# Patient Record
Sex: FEMALE | Race: WHITE | NOT HISPANIC OR LATINO | Employment: FULL TIME | URBAN - METROPOLITAN AREA
[De-identification: names, ages, dates, MRNs, and addresses within clinical notes are randomized per-mention and may not be internally consistent; named-entity substitution may affect disease eponyms.]

---

## 2017-09-29 ENCOUNTER — OFFICE VISIT (OUTPATIENT)
Dept: FAMILY MEDICINE | Facility: CLINIC | Age: 47
End: 2017-09-29
Payer: COMMERCIAL

## 2017-09-29 VITALS
SYSTOLIC BLOOD PRESSURE: 107 MMHG | HEART RATE: 87 BPM | TEMPERATURE: 98.2 F | RESPIRATION RATE: 18 BRPM | WEIGHT: 119 LBS | BODY MASS INDEX: 22.47 KG/M2 | HEIGHT: 61 IN | OXYGEN SATURATION: 98 % | DIASTOLIC BLOOD PRESSURE: 70 MMHG

## 2017-09-29 DIAGNOSIS — L98.9 SKIN LESION: ICD-10-CM

## 2017-09-29 DIAGNOSIS — Z00.00 ROUTINE GENERAL MEDICAL EXAMINATION AT A HEALTH CARE FACILITY: Primary | ICD-10-CM

## 2017-09-29 DIAGNOSIS — Z23 NEED FOR TDAP VACCINATION: ICD-10-CM

## 2017-09-29 LAB
CHOLEST SERPL-MCNC: 164 MG/DL
HDLC SERPL-MCNC: 67 MG/DL
LDLC SERPL CALC-MCNC: 81 MG/DL
NONHDLC SERPL-MCNC: 97 MG/DL
TRIGL SERPL-MCNC: 82 MG/DL

## 2017-09-29 PROCEDURE — 90471 IMMUNIZATION ADMIN: CPT | Performed by: FAMILY MEDICINE

## 2017-09-29 PROCEDURE — 87624 HPV HI-RISK TYP POOLED RSLT: CPT | Performed by: FAMILY MEDICINE

## 2017-09-29 PROCEDURE — 90715 TDAP VACCINE 7 YRS/> IM: CPT | Performed by: FAMILY MEDICINE

## 2017-09-29 PROCEDURE — 36415 COLL VENOUS BLD VENIPUNCTURE: CPT | Performed by: FAMILY MEDICINE

## 2017-09-29 PROCEDURE — G0145 SCR C/V CYTO,THINLAYER,RESCR: HCPCS | Performed by: FAMILY MEDICINE

## 2017-09-29 PROCEDURE — 80061 LIPID PANEL: CPT | Performed by: FAMILY MEDICINE

## 2017-09-29 PROCEDURE — 99396 PREV VISIT EST AGE 40-64: CPT | Mod: 25 | Performed by: FAMILY MEDICINE

## 2017-09-29 NOTE — LETTER
October 11, 2017    Sidra Macedo  [address] [facility] [address]    Dear Sidra,  We are happy to inform you that your PAP smear result from 09/29/17 is normal.  We are now able to do a follow up test on PAP smears. The DNA test is for HPV (Human Papilloma Virus). Cervical cancer is closely linked with certain types of HPV. Your result showed no evidence of high risk HPV.  Therefore we recommend you return in 5 years for your next pap smear and HPV test.  You will still need to return to the clinic every year for an annual exam and other preventive tests.  Please contact the clinic at [phone] with any questions.  Sincerely,    Quita Zee MD/uriel

## 2017-09-29 NOTE — MR AVS SNAPSHOT
After Visit Summary   9/29/2017    Sidra Macedo    MRN: [id number]           Patient Information     Date Of Birth          1970        Visit Information        Provider Department      9/29/2017 1:40 PM Quita Zee MD [facility]        Today's Diagnoses     Routine general medical examination at a health care facility    -  1    Need for Tdap vaccination        Skin lesion          Care Instructions      Preventive Health Recommendations  Female Ages 40 to 49    Yearly exam:     See your health care provider every year in order to  1. Review health changes.   2. Discuss preventive care.    3. Review your medicines if your doctor prescribed any.      Get a Pap test every three years (unless you have an abnormal result and your provider advises testing more often).      If you get Pap tests with HPV test, you only need to test every 5 years, unless you have an abnormal result. You do not need a Pap test if your uterus was removed (hysterectomy) and you have not had cancer.      You should be tested each year for STDs (sexually transmitted diseases), if you're at risk.       Ask your doctor if you should have a mammogram.      Have a colonoscopy (test for colon cancer) if someone in your family has had colon cancer or polyps before age 50.       Have a cholesterol test every 5 years.       Have a diabetes test (fasting glucose) after age 45. If you are at risk for diabetes, you should have this test every 3 years.    Shots: Get a flu shot each year. Get a tetanus shot every 10 years.     Nutrition:     Eat at least 5 servings of fruits and vegetables each day.    Eat whole-grain bread, whole-wheat pasta and brown rice instead of white grains and rice.    Talk to your provider about Calcium and Vitamin D.     Lifestyle    Exercise at least 150 minutes a week (an average of 30 minutes a day, 5 days a week). This will help you control your weight and prevent  "disease.    Limit alcohol to one drink per day.    No smoking.     Wear sunscreen to prevent skin cancer.    See your dentist every six months for an exam and cleaning.          Follow-ups after your visit        Additional Services     DERMATOLOGY REFERRAL       Your provider has referred you to: [facility]: [facility] [place] [phone]   [email or web address]    Please be aware that coverage of these services is subject to the terms and limitations of your health insurance plan.  Call member services at your health plan with any benefit or coverage questions.      Please bring the following with you to your appointment:    (1) Any X-Rays, CTs or MRIs which have been performed.  Contact the facility where they were done to arrange for  prior to your scheduled appointment.    (2) List of current medications  (3) This referral request   (4) Any documents/labs given to you for this referral                  Who to contact     If you have questions or need follow up information about today's clinic visit or your schedule please contact [facility] directly at [phone].  Normal or non-critical lab and imaging results will be communicated to you by MyChart, letter or phone within 4 business days after the clinic has received the results. If you do not hear from us within 7 days, please contact the clinic through [employer] or phone. If you have a critical or abnormal lab result, we will notify you by phone as soon as possible.  Submit refill requests through [employer] or call your pharmacy and they will forward the refill request to us. Please allow 3 business days for your refill to be completed.          Additional Information About Your Visit        [employer] Information     [employer] lets you send messages to your doctor, view your test results, renew your prescriptions, schedule appointments and more. To sign up, go to [email or web address] . Click on \"Log in\" " "on the left side of the screen, which will take you to the Welcome page. Then click on \"Sign up Now\" on the right side of the page.     You will be asked to enter the access code listed below, as well as some personal information. Please follow the directions to create your username and password.     Your access code is: [email or web address]  Expires: 2017  2:40 PM     Your access code will  in 90 days. If you need help or a new code, please call your [facility] clinic or [phone].        Care EveryWhere ID     This is your Care EveryWhere ID. This could be used by other organizations to access your [facility] medical records  [id number]        Your Vitals Were     Pulse Temperature Respirations Height Last Period Pulse Oximetry    87 98.2  F (36.8  C) (Tympanic) 18 5' 0.75\" (1.543 m) 2017 (Exact Date) 98%    BMI (Body Mass Index)                   22.67 kg/m2            Blood Pressure from Last 3 Encounters:   17 107/70   09/17/15 110/66   06 118/64    Weight from Last 3 Encounters:   17 119 lb (54 kg)   09/17/15 119 lb (54 kg)   06 132 lb (59.9 kg)              We Performed the Following     DERMATOLOGY REFERRAL     HPV High Risk Types DNA Cervical     Lipid Profile     Pap imaged thin layer screen with HPV - recommended age 30 - 65     TDAP VACCINE (ADACEL)        Primary Care Provider    None Specified       No primary provider on file.        Equal Access to Services     [facility] Hospital[facility] : Hadii rosy Young, waaxda luosmaniadaha, qaybta kaalmada cesar, fay kumar . So [facility] [phone].    ATENCIÓN: Si habla español, tiene a aguilar disposición servicios gratuitos de asistencia lingüística. Llame al [phone].    We comply with applicable federal civil rights laws and [place] laws. We do not discriminate on the basis of race, color, national origin, age, disability, sex, sexual orientation, or gender identity.            Thank you!     " Thank you for choosing [facility]  for your care. Our goal is always to provide you with excellent care. Hearing back from our patients is one way we can continue to improve our services. Please take a few minutes to complete the written survey that you may receive in the mail after your visit with us. Thank you!             Your Updated Medication List - Protect others around you: Learn how to safely use, store and throw away your medicines at www.disposemymeds.org.          This list is accurate as of: 9/29/17  2:40 PM.  Always use your most recent med list.                   Brand Name Dispense Instructions for use Diagnosis    clindamycin 300 MG capsule    CLEOCIN    14 capsule    Take 1 cap po BID x 7 days    BV (bacterial vaginosis)       VITAMIN B 12 PO           VITAMIN D (CHOLECALCIFEROL) PO      Take by mouth daily        VITAMIN E PO

## 2017-09-29 NOTE — NURSING NOTE
Screening Questionnaire for Adult Immunization     Are you sick today?   No    Do you have allergies to medications, food or any vaccine?   No    Have you ever had a serious reaction after receiving a vaccination?   No    Do you have a long-term health problem with heart disease, lung disease,  asthma, kidney disease, diabetes, anemia, metabolic or blood disease?   No    Do you have cancer, leukemia, AIDS, or any immune system problem?   No    Do you take cortisone, prednisone, other steroids, or anticancer drugs, or  have you had any x-ray (radiation) treatments?   No    Have you had a seizure, brain, or other nervous system problem?   No    During the past year, have you received a transfusion of blood or blood       products, or been given a medicine called immune (gamma) globulin?   No    For women: Are you pregnant or is there a chance you could become         pregnant during the next month?   No    Have you received any vaccinations in the past 4 weeks?   No     Immunization questionnaire answers were all negative.      MNVFC doesn't apply on this patient     Screening performed by Marleny Downey on 9/29/2017 at 3:08 PM.  Per orders of Dr. Zee, injection(s) of TDAP given by Marleny Downey. Patient instructed to remain in clinic for 20 minutes afterwards, and to report any adverse reaction to me immediately

## 2017-09-29 NOTE — PROGRESS NOTES
SUBJECTIVE:   CC: Sidra Macedo is an 47 year old woman who presents for preventive health visit.     Physical   Annual:     Getting at least 3 servings of Calcium per day::  Yes    Bi-annual eye exam::  NO    Dental care twice a year::  NO    Sleep apnea or symptoms of sleep apnea::  None    Diet::  Regular (no restrictions)    Taking medications regularly::  Yes    Medication side effects::  Not applicable    Additional concerns today::  YES (cyst on right side abdomen, and headaches )      Headaches: she reports a long history of tension headaches, but over hte past 6-8 months, she has been getting some different headaches which are very brief, just a flash of pain on the top/middle of her head.  She has not identified any triggers; she does not know if it happens more at rest or with exertion.  It does not wake her from sleep.  It is not associated with other symptoms.  It might happen 3-4 times per day for a week, then it might not happen again for a while.  She wonders if it might be stress.     Skin lesions: a newer dark skin lesion to her forehead, not growing or change, not symptomatic.  Her father has had melanoma.  She did use tanning beds a bit in her youth.  She has also had a bump to her genital area which she thought was just an ingrown hair; she pulled on the hair and some pus came out; it has not drained since, but there is a lump there.  It is not painful.  No fevers.      CV: early CAD in the family in father with MI at 50, also PGF.  The patient smokes 1 ppd.    Malignancy: She had a molar pregnancy/cancer growth that was addressed in the past with OB/GYN.  She has not had a physical since 2006, which was her last pap.  She has not had a mammogram. Her mother has had breast cancer which started at age 63; her mother had negative testing for BRCA.  Her paternal grandmother also had breast cancer, ovarian cancer and liver cancer.  Her maternal grandmother had lung cancer.  The patient herself  smokes 1ppd, which she has done for 30 years.  She is not ready to quit. No colon cancer in the family   Father has had melanoma.    Bone health: tobacco use  Immunizations: due for tdap  Sexual health: periods are regular, no concern for STI, no vaginal discharge/itching/odor.  She has had two vaginal deliveries (children are 23 and 13).  Patient's last menstrual period was 08/28/2017 (exact date).  Depression screen:    Right knee surgery-acl    She has quit smoking in the past with the help of Chantix (per note review this caused constipation).  She states she is not ready yet to try and quit smoking.          Today's PHQ-2 Score:   PHQ-2 ( 1999 Pfizer) 9/29/2017   Q1: Little interest or pleasure in doing things 0   Q2: Feeling down, depressed or hopeless 0   PHQ-2 Score 0   Q1: Little interest or pleasure in doing things Not at all   Q2: Feeling down, depressed or hopeless Not at all   PHQ-2 Score 0       Abuse: Current or Past(Physical, Sexual or Emotional)- No  Do you feel safe in your environment - Yes    Social History   Substance Use Topics     Smoking status: Current Every Day Smoker     Last attempt to quit: 8/1/2005     Smokeless tobacco: Never Used     Alcohol use No     The patient does not drink >3 drinks per day nor >7 drinks per week.    Reviewed orders with patient.  Reviewed health maintenance and updated orders accordingly - Yes  Patient Active Problem List   Diagnosis     Pregnancy with history of trophoblastic disease     Past Surgical History:   Procedure Laterality Date     C NONSPECIFIC PROCEDURE      ACL repair right knee       Social History   Substance Use Topics     Smoking status: Current Every Day Smoker     Packs/day: 1.00     Years: 30.00     Last attempt to quit: 8/1/2005     Smokeless tobacco: Never Used     Alcohol use No     Family History   Problem Relation Age of Onset     Respiratory Mother      asthma     Breast Cancer Mother 63     BRCA negative     HEART DISEASE Father 50      bypass     Alcohol/Drug Father      Coronary Artery Disease Early Onset Paternal Grandfather      Breast Cancer Paternal Grandmother      Ovarian Cancer Paternal Grandmother      Liver Cancer Paternal Grandmother      HEART DISEASE Maternal Grandmother      CHF     Respiratory Maternal Grandmother      as     CANCER Maternal Grandmother      lung     Alcohol/Drug Maternal Grandmother      Neurologic Disorder Brother      seizures     Asthma Brother          Current Outpatient Prescriptions   Medication Sig Dispense Refill     Cyanocobalamin (VITAMIN B 12 PO)        VITAMIN D, CHOLECALCIFEROL, PO Take by mouth daily       VITAMIN E PO        clindamycin (CLEOCIN) 300 MG capsule Take 1 cap po BID x 7 days (Patient not taking: Reported on 9/29/2017) 14 capsule 0     No Known Allergies      Patient under age 50, mutual decision reflected in health maintenance.        Pertinent mammograms are reviewed under the imaging tab.  History of abnormal Pap smear: NO - age 30-65 PAP every 5 years with negative HPV co-testing recommended    Reviewed and updated as needed this visit by clinical staffTobacco  Allergies  Meds  Med Hx  Surg Hx  Fam Hx  Soc Hx        Reviewed and updated as needed this visit by Provider            ROS:  C: NEGATIVE for fever, chills, change in weight  I: NEGATIVE for worrisome rashes, moles or lesions  E: NEGATIVE for vision changes or irritation  ENT: NEGATIVE for ear, mouth and throat problems  R: NEGATIVE for significant cough or SOB  B: NEGATIVE for masses, tenderness or discharge  CV: NEGATIVE for chest pain, palpitations or peripheral edema  GI: NEGATIVE for nausea, abdominal pain, heartburn, or change in bowel habits  : NEGATIVE for unusual urinary or vaginal symptoms. Periods are regular.  M: NEGATIVE for significant arthralgias or myalgia  N: NEGATIVE for weakness, dizziness or paresthesias  P: NEGATIVE for changes in mood or affect     OBJECTIVE:   /70 (BP Location: Right  "arm, Patient Position: Sitting, Cuff Size: Adult Regular)  Pulse 87  Temp 98.2  F (36.8  C) (Tympanic)  Resp 18  Ht 5' 0.75\" (1.543 m)  Wt 119 lb (54 kg)  LMP 08/28/2017 (Exact Date)  SpO2 98%  BMI 22.67 kg/m2  EXAM:  GENERAL: healthy, alert and no distress  EYES: Eyes grossly normal to inspection, PERRL and conjunctivae and sclerae normal  HENT: ear canals and TM's normal, nose and mouth without ulcers or lesions  NECK: no adenopathy, no asymmetry, masses, or scars and thyroid normal to palpation  RESP: lungs clear to auscultation - no rales, rhonchi or wheezes  BREAST: normal without masses, tenderness or nipple discharge and no palpable axillary masses or adenopathy  CV: regular rate and rhythm, normal S1 S2, no S3 or S4, no murmur, click or rub, no peripheral edema and peripheral pulses strong  ABDOMEN: soft, nontender, no hepatosplenomegaly, no masses and bowel sounds normal   (female): normal female external genitalia, normal urethral meatus, vaginal mucosa pink, moist, well rugated, and normal cervix/adnexa/uterus without masses or discharge (difficult exam, uterus tilted, cervix very posterior and to the patient's left)  MS: no gross musculoskeletal defects noted, no edema  SKIN: a 3-4mm dark brown/black nevus to her right forehead with sharp borders just below the scalp line, there is a larger >5mm dark brown/black nevus with slightly blurred borders to her mid back, and a smaller similar dark lesion to her lower back.  There is a 1.5cm nodule to her mons pubis that is firm, mobile, and nontender; there isn't any fluctuance, erythema or surrounding induration.  There are mildly enlarged and tender inguinal LAD bilaterally.  Otherwise no suspicious lesions or rashes  NEURO: Normal strength and tone, mentation intact and speech normal  PSYCH: mentation appears normal, affect normal/bright    ASSESSMENT/PLAN:   1. Routine general medical examination at a health care facility  CV: lipid panel today " "(not fasting), strongly advised smoking cessation.   Malignancy: pap and HPV today, recommended she start mammograms, given her family history, and recommended colonoscopy at 50.  Recommended dermatology evaluation of her skin, with some very dark brown/black lesions.  Again, smoking cessation advised.  She will qualify for lung cancer screening at age 54.  Bone health:: urged tobacco discontinuation  Immunizations: tdap    - Pap imaged thin layer screen with HPV - recommended age 30 - 65  - HPV High Risk Types DNA Cervical  - Lipid Profile    2. Need for Tdap vaccination    - TDAP VACCINE (ADACEL)  - ADMIN 1st VACCINE    3. Skin lesion  The lesion to the mons is a healing abscess or cyst; there was no fluctuance, erythema or tenderness to indicate active infection or need for I&D at this time.  Could discuss with dermatology along with total skin check.    - DERMATOLOGY REFERRAL    4.  Headaches: normal neuro exam today, f/u if needed, no red flags to suggest urgent imaging.       COUNSELING:  Reviewed preventive health counseling, as reflected in patient instructions       Regular exercise       Healthy diet/nutrition       Immunizations    Vaccinated for: TDAP               reports that she has been smoking.  She has never used smokeless tobacco.  Tobacco Cessation Action Plan: Information offered: Patient not interested at this time  Estimated body mass index is 22.67 kg/(m^2) as calculated from the following:    Height as of this encounter: 5' 0.75\" (1.543 m).    Weight as of this encounter: 119 lb (54 kg).       Counseling Resources:  ATP IV Guidelines  Pooled Cohorts Equation Calculator  Breast Cancer Risk Calculator  FRAX Risk Assessment  ICSI Preventive Guidelines  Dietary Guidelines for Americans, 2010  [employer]'s MyPlate  ASA Prophylaxis  Lung CA Screening    Quita Zee MD  [facility]  Answers for HPI/ROS submitted by the patient on 9/29/2017   PHQ-2 Score: 0    "

## 2017-09-29 NOTE — LETTER
October 9, 2017      [facility]  [address] [address]        Dear ,    We are writing to inform you of your test results.    Your cholesterol results are excellent.  Let us know if you have any questions.     Resulted Orders   Lipid Profile   Result Value Ref Range    Cholesterol 164 <200 mg/dL    Triglycerides 82 <150 mg/dL      Comment:      Non Fasting    HDL Cholesterol 67 >49 mg/dL    LDL Cholesterol Calculated 81 <100 mg/dL      Comment:      Desirable:       <100 mg/dl    Non HDL Cholesterol 97 <130 mg/dL       If you have any questions or concerns, please call the clinic at the number listed above.       Sincerely,        Quita Zee MD/nr

## 2017-09-29 NOTE — NURSING NOTE
"Chief Complaint   Patient presents with     Physical       Initial /70 (BP Location: Right arm, Patient Position: Sitting, Cuff Size: Adult Regular)  Pulse 87  Temp 98.2  F (36.8  C) (Tympanic)  Resp 18  Ht 5' 0.75\" (1.543 m)  Wt 119 lb (54 kg)  LMP 08/28/2017 (Exact Date)  SpO2 98%  BMI 22.67 kg/m2 Estimated body mass index is 22.67 kg/(m^2) as calculated from the following:    Height as of this encounter: 5' 0.75\" (1.543 m).    Weight as of this encounter: 119 lb (54 kg).  Medication Reconciliation: complete       Marleny Downey MA      "

## 2017-10-03 LAB
COPATH REPORT: NORMAL
PAP: NORMAL

## 2017-10-09 LAB
FINAL DIAGNOSIS: NORMAL
HPV HR 12 DNA CVX QL NAA+PROBE: NEGATIVE
HPV16 DNA SPEC QL NAA+PROBE: NEGATIVE
HPV18 DNA SPEC QL NAA+PROBE: NEGATIVE
SPECIMEN DESCRIPTION: NORMAL

## 2018-02-06 ENCOUNTER — OFFICE VISIT (OUTPATIENT)
Dept: PEDIATRICS | Facility: CLINIC | Age: 48
End: 2018-02-06
Payer: COMMERCIAL

## 2018-02-06 VITALS
SYSTOLIC BLOOD PRESSURE: 130 MMHG | WEIGHT: 126 LBS | BODY MASS INDEX: 24 KG/M2 | RESPIRATION RATE: 16 BRPM | TEMPERATURE: 98.3 F | DIASTOLIC BLOOD PRESSURE: 70 MMHG | HEART RATE: 82 BPM

## 2018-02-06 DIAGNOSIS — R59.1 LA (LYMPHADENOPATHY): Primary | ICD-10-CM

## 2018-02-06 PROCEDURE — 99213 OFFICE O/P EST LOW 20 MIN: CPT | Performed by: INTERNAL MEDICINE

## 2018-02-06 NOTE — PROGRESS NOTES
"  SUBJECTIVE:   Sidra Macedo is a 48 year old female who presents to clinic today for the following health issues:      Sidra presents to the clinic for the complaint of a lump/bump on the lower left back of her head. Patient noticed the area on Friday, but became more painful yesterday. She notes associated tenderness in the left side of her neck that started yesterday. Patient notes she was out of the country in [place] for 10 days, she just returned yesterday.  She has noted soaking sweats at night  for about 1 week.  Notes a minor rash on her chest she believes is a heat rash. Endorses constipation. She does note that sweats related to  Perimenopausal \"come and go\" but she has not had any recently. Denies nausea, vomiting, weight loss, sore throat, fever and cough.       Problem list and histories reviewed & adjusted, as indicated.  Additional history: as documented    Patient Active Problem List   Diagnosis     Pregnancy with history of trophoblastic disease     Past Surgical History:   Procedure Laterality Date     C NONSPECIFIC PROCEDURE      ACL repair right knee       Social History   Substance Use Topics     Smoking status: Current Every Day Smoker     Packs/day: 1.00     Years: 30.00     Last attempt to quit: 8/1/2005     Smokeless tobacco: Never Used     Alcohol use No     Family History   Problem Relation Age of Onset     Respiratory Mother      asthma     Breast Cancer Mother 63     BRCA negative     HEART DISEASE Father 50     bypass     Alcohol/Drug Father      Coronary Artery Disease Early Onset Paternal Grandfather      Breast Cancer Paternal Grandmother      Ovarian Cancer Paternal Grandmother      Liver Cancer Paternal Grandmother      HEART DISEASE Maternal Grandmother      CHF     Respiratory Maternal Grandmother      as     CANCER Maternal Grandmother      lung     Alcohol/Drug Maternal Grandmother      Neurologic Disorder Brother      seizures     Asthma Brother          Current Outpatient " Prescriptions   Medication Sig Dispense Refill     Cyanocobalamin (VITAMIN B 12 PO)        VITAMIN D, CHOLECALCIFEROL, PO Take by mouth daily       VITAMIN E PO        No Known Allergies  BP Readings from Last 3 Encounters:   02/06/18 130/70   09/29/17 107/70   09/17/15 110/66    Wt Readings from Last 3 Encounters:   02/06/18 57.2 kg (126 lb)   09/29/17 54 kg (119 lb)   09/17/15 54 kg (119 lb)                  Labs reviewed in EPIC    Reviewed and updated as needed this visit by clinical staff  Tobacco  Allergies  Meds  Med Hx  Surg Hx  Fam Hx  Soc Hx      Reviewed and updated as needed this visit by Provider         ROS:  Constitutional, HEENT, cardiovascular, pulmonary, gi and gu systems are negative, except as otherwise noted.      This document serves as a record of the services and decisions personally performed and made by Mirna Dillard MD. It was created on her behalf by Salma Campbell, a trained medical scribe. The creation of this document is based the provider's statements to the medical scribe.    Salma Campbell February 6, 2018 11:38 AM  OBJECTIVE:     /70 (BP Location: Right arm, Patient Position: Chair, Cuff Size: Adult Regular)  Pulse 82  Temp 98.3  F (36.8  C) (Oral)  Resp 16  Wt 57.2 kg (126 lb)  BMI 24 kg/m2  Body mass index is 24 kg/(m^2).  GENERAL: healthy, alert and no distress  Head:  2 cm tender lump left occipital area.  Not erythematous, no drainage noted  HENT: ear canals and TM's normal, nose and mouth without ulcers or lesions. No conjunctival irritation noted.   NECK: , no asymmetry, masses, or scars and thyroid normal to palpation.  Left anterior cervical chain very tender to palpation, difficult to assess discrete LN.   RESP: lungs clear to auscultation - no rales, rhonchi or wheezes  CV: regular rate and rhythm, normal S1 S2, no S3 or S4, no murmur, click or rub   ABDOMEN: soft, nontender, no hepatosplenomegaly, no masses and bowel sounds normal  MS: no gross  musculoskeletal defects noted, no edema  PSYCH: mentation appears normal, affect normal/bright  Lymph:  2 cm occipital LN as above.  Tenderness along the left anterior cervical chain, but no clear discrete adenopathy.  No other cervical or submandibular adenopathy present.  No supraclavicular adenopathy.  No axillary or inguinal adenopathy.  No splenic enlargement.        Diagnostic Test Results:  none     ASSESSMENT/PLAN:   (R59.1) LA (lymphadenopathy)  (primary encounter diagnosis)  -- ddx includes reactive LN, viral illness (zika possible but less likely without other symptoms).  Less likely would be lymphoma considering tenderness and lack of other LN.  Does seem to be bacterial infection- no redness.  Will not treat with abx at this time.    --  Advised patient lump should decrease in 2-3 days and will start to feel better   -- Ibuprofen for pain management   -- reviewed red flag sx to come into clinic: weight loss, vomiting, abdominal pain and additional lymph nodes showing up   -- suspect hot flashes are increase in perimenopausal sx; patient to contact me if worsening  -if not improving over the next 7 days or worsening would consider CBC with diff, ESR, CRP, LDH.      Follow up if symptoms are not improving or are worsening.       The information in this document, created by the medical scribe for me, accurately reflects the services I personally performed and the decisions made by me. I have reviewed and approved this document for accuracy prior to leaving the patient care area.  Mirna Dillard MD  [facility]3]AN

## 2018-02-06 NOTE — MR AVS SNAPSHOT
"              After Visit Summary   2/6/2018    Sidra Macedo    MRN: [id number]           Patient Information     Date Of Birth          1970        Visit Information        Provider Department      2/6/2018 11:20 AM [facility]        Today's Diagnoses     LA (lymphadenopathy)    -  1      Care Instructions    Watch the area. It should last 2-3 days and then getting better.     If you notice lumps showing up in other areas, vomiting, abdominal pain and weight loss then you need to let me know.     If you continue to have problems with sweating let me know.     Ibuprofen for pain.     Follow up if symptoms are not improving or are worsening.             Follow-ups after your visit        Who to contact     If you have questions or need follow up information about today's clinic visit or your schedule please contact [facility] directly at [phone].  Normal or non-critical lab and imaging results will be communicated to you by MyChart, letter or phone within 4 business days after the clinic has received the results. If you do not hear from us within 7 days, please contact the clinic through MyChart or phone. If you have a critical or abnormal lab result, we will notify you by phone as soon as possible.  Submit refill requests through [employer] or call your pharmacy and they will forward the refill request to us. Please allow 3 business days for your refill to be completed.          Additional Information About Your Visit        MyChart Information     [employer] lets you send messages to your doctor, view your test results, renew your prescriptions, schedule appointments and more. To sign up, go to [email or web address] . Click on \"Log in\" on the left side of the screen, which will take you to the Welcome page. Then click on \"Sign up Now\" on the right side of the page.     You will be asked to enter the access code listed below, as well as some personal " information. Please follow the directions to create your username and password.     Your access code is: [email or web address]  Expires: 2018 11:46 AM     Your access code will  in 90 days. If you need help or a new code, please call your [place] clinic or [phone].        Care EveryWhere ID     This is your Care EveryWhere ID. This could be used by other organizations to access your [place] medical records  [id number]        Your Vitals Were     Pulse Temperature Respirations BMI (Body Mass Index)          82 98.3  F (36.8  C) (Oral) 16 24 kg/m2         Blood Pressure from Last 3 Encounters:   18 130/70   17 107/70   09/17/15 110/66    Weight from Last 3 Encounters:   18 126 lb (57.2 kg)   17 119 lb (54 kg)   09/17/15 119 lb (54 kg)              Today, you had the following     No orders found for display       Primary Care Provider Fax #    Physician No Ref-Primary [phone]       No address on file        Equal Access to Services     [facility]: Hadii rosy Young, waaxda luqadaha, qaybta kaalmamelba guerrero, fay kumar . So [facility] [phone].    ATENCIÓN: Si habla español, tiene a aguilar disposición servicios gratuitos de asistencia lingüística. Zenobiaame al [phone].    We comply with applicable federal civil rights laws and [place] laws. We do not discriminate on the basis of race, color, national origin, age, disability, sex, sexual orientation, or gender identity.            Thank you!     Thank you for choosing [facility]  for your care. Our goal is always to provide you with excellent care. Hearing back from our patients is one way we can continue to improve our services. Please take a few minutes to complete the written survey that you may receive in the mail after your visit with us. Thank you!             Your Updated Medication List - Protect others around you: Learn how to safely use, store and throw away your  medicines at www.disposemymeds.org.          This list is accurate as of 2/6/18 11:46 AM.  Always use your most recent med list.                   Brand Name Dispense Instructions for use Diagnosis    VITAMIN B 12 PO           VITAMIN D (CHOLECALCIFEROL) PO      Take by mouth daily        VITAMIN E PO

## 2018-02-06 NOTE — PATIENT INSTRUCTIONS
Watch the area. It should last 2-3 days and then getting better.     If you notice lumps showing up in other areas, vomiting, abdominal pain and weight loss then you need to let me know.     If you continue to have problems with sweating let me know.     Ibuprofen for pain.     Follow up if symptoms are not improving or are worsening.

## 2018-02-07 ENCOUNTER — APPOINTMENT (OUTPATIENT)
Dept: URGENT CARE | Facility: URGENT CARE | Age: 48
End: 2018-02-07
Payer: COMMERCIAL

## 2018-02-08 ENCOUNTER — TELEPHONE (OUTPATIENT)
Dept: PEDIATRICS | Facility: CLINIC | Age: 48
End: 2018-02-08

## 2018-02-08 DIAGNOSIS — R59.1 LYMPHADENOPATHY: Primary | ICD-10-CM

## 2018-02-08 NOTE — TELEPHONE ENCOUNTER
Reason for Call:  Other lymph nodes    Detailed comments: pt called and said she does have changes in lymph nodes. The right ocipital node is swelling up. And right groin node is starting to swell up and is painful. Would like WBC done is the labs please.    Pt went to  last night and they would not do the labs for her because of the care plan that is in place.  Please call the pt when the lab is in.    Phone Number Patient can be reached at: Home number on file [phone] (home)    Best Time: any    Can we leave a detailed message on this number? YES    Call taken on 2/8/2018 at 4:40 PM by Kerri Cassidy

## 2018-02-09 ENCOUNTER — OFFICE VISIT (OUTPATIENT)
Dept: PEDIATRICS | Facility: CLINIC | Age: 48
End: 2018-02-09
Payer: COMMERCIAL

## 2018-02-09 DIAGNOSIS — D72.829 LEUKOCYTOSIS, UNSPECIFIED TYPE: ICD-10-CM

## 2018-02-09 DIAGNOSIS — R59.1 LYMPHADENOPATHY: Primary | ICD-10-CM

## 2018-02-09 LAB
BASOPHILS # BLD AUTO: 0.1 10E9/L (ref 0–0.2)
BASOPHILS NFR BLD AUTO: 0.4 %
CRP SERPL-MCNC: 5.9 MG/L (ref 0–8)
DIFFERENTIAL METHOD BLD: ABNORMAL
EOSINOPHIL # BLD AUTO: 0.4 10E9/L (ref 0–0.7)
EOSINOPHIL NFR BLD AUTO: 3.7 %
ERYTHROCYTE [DISTWIDTH] IN BLOOD BY AUTOMATED COUNT: 13.6 % (ref 10–15)
ERYTHROCYTE [SEDIMENTATION RATE] IN BLOOD BY WESTERGREN METHOD: 8 MM/H (ref 0–20)
HCT VFR BLD AUTO: 38.3 % (ref 35–47)
HGB BLD-MCNC: 13.6 G/DL (ref 11.7–15.7)
LDH SERPL L TO P-CCNC: 159 U/L (ref 81–234)
LYMPHOCYTES # BLD AUTO: 1.6 10E9/L (ref 0.8–5.3)
LYMPHOCYTES NFR BLD AUTO: 13.3 %
MCH RBC QN AUTO: 31.9 PG (ref 26.5–33)
MCHC RBC AUTO-ENTMCNC: 35.5 G/DL (ref 31.5–36.5)
MCV RBC AUTO: 90 FL (ref 78–100)
MONOCYTES # BLD AUTO: 0.7 10E9/L (ref 0–1.3)
MONOCYTES NFR BLD AUTO: 6 %
NEUTROPHILS # BLD AUTO: 9.1 10E9/L (ref 1.6–8.3)
NEUTROPHILS NFR BLD AUTO: 76.6 %
PLATELET # BLD AUTO: 287 10E9/L (ref 150–450)
RBC # BLD AUTO: 4.27 10E12/L (ref 3.8–5.2)
WBC # BLD AUTO: 11.8 10E9/L (ref 4–11)

## 2018-02-09 PROCEDURE — 85025 COMPLETE CBC W/AUTO DIFF WBC: CPT | Performed by: INTERNAL MEDICINE

## 2018-02-09 PROCEDURE — 83615 LACTATE (LD) (LDH) ENZYME: CPT | Performed by: INTERNAL MEDICINE

## 2018-02-09 PROCEDURE — 99213 OFFICE O/P EST LOW 20 MIN: CPT | Performed by: INTERNAL MEDICINE

## 2018-02-09 PROCEDURE — 86140 C-REACTIVE PROTEIN: CPT | Performed by: INTERNAL MEDICINE

## 2018-02-09 PROCEDURE — 36415 COLL VENOUS BLD VENIPUNCTURE: CPT | Performed by: INTERNAL MEDICINE

## 2018-02-09 PROCEDURE — 85652 RBC SED RATE AUTOMATED: CPT | Performed by: INTERNAL MEDICINE

## 2018-02-09 NOTE — MR AVS SNAPSHOT
"              After Visit Summary   2018    Sidra Macedo    MRN: [id number]           Patient Information     Date Of Birth          1970        Visit Information        Provider Department      2018 9:00 AM Gita Alexis MD [facility]        Today's Diagnoses     Lymphadenopathy          Care Instructions    1. Labs today: blood counts, sed rate and CRP (look at inflammation) and LDH          Follow-ups after your visit        Who to contact     If you have questions or need follow up information about today's clinic visit or your schedule please contact [facility] directly at [phone].  Normal or non-critical lab and imaging results will be communicated to you by [employer], letter or phone within 4 business days after the clinic has received the results. If you do not hear from us within 7 days, please contact the clinic through [employer] or phone. If you have a critical or abnormal lab result, we will notify you by phone as soon as possible.  Submit refill requests through [employer] or call your pharmacy and they will forward the refill request to us. Please allow 3 business days for your refill to be completed.          Additional Information About Your Visit        MyChart Information     [employer] lets you send messages to your doctor, view your test results, renew your prescriptions, schedule appointments and more. To sign up, go to [email or web address] . Click on \"Log in\" on the left side of the screen, which will take you to the Welcome page. Then click on \"Sign up Now\" on the right side of the page.     You will be asked to enter the access code listed below, as well as some personal information. Please follow the directions to create your username and password.     Your access code is: [email or web address]  Expires: 2018 11:46 AM     Your access code will  in 90 days. If you need help or a new code, please call your [place] clinic or [phone].      " "  Care EveryWhere ID     This is your Care EveryWhere ID. This could be used by other organizations to access your [place] medical records  [id number]        Your Vitals Were     Pulse Temperature Height BMI (Body Mass Index)          92 97.5  F (36.4  C) (Tympanic) 5' 1\" (1.549 m) 23.81 kg/m2         Blood Pressure from Last 3 Encounters:   02/09/18 140/76   02/06/18 130/70   09/29/17 107/70    Weight from Last 3 Encounters:   02/09/18 126 lb (57.2 kg)   02/06/18 126 lb (57.2 kg)   09/29/17 119 lb (54 kg)              We Performed the Following     CBC with platelets differential     CRP inflammation     Erythrocyte sedimentation rate auto     Lactate Dehydrogenase        Primary Care Provider Office Phone # Fax #    Mirna Dillard [phone] [address] [place] [address]        Equal Access to Services     [facility]: Hadii aad ku hadasho Soawilda, waaxda luqadaha, qaybta kaalmada adeegyada, fay albaradoin milena kumar . So [facility] [phone].    ATENCIÓN: Si habla español, tiene a aguilar disposición servicios gratuitos de asistencia lingüística. [facility] [phone].    We comply with applicable federal civil rights laws and [place] laws. We do not discriminate on the basis of race, color, national origin, age, disability, sex, sexual orientation, or gender identity.            Thank you!     [facility]. Our goal is always to provide you with excellent care. Hearing back from our patients is one way we can continue to improve our services. Please take a few minutes to complete the written survey that you may receive in the mail after your visit with us. Thank you!             Your Updated Medication List - Protect others around you: Learn how to safely use, store and throw away your medicines at www.disposemymeds.org.          This list is accurate as of 2/9/18  9:30 AM.  Always use your most recent " med list.                   Brand Name Dispense Instructions for use Diagnosis    VITAMIN B 12 PO           VITAMIN D (CHOLECALCIFEROL) PO      Take by mouth daily        VITAMIN E PO

## 2018-02-09 NOTE — PROGRESS NOTES
"  SUBJECTIVE:   Sidra Macedo is a 48 year old female who presents to clinic today for the following health issues:      Follow up from  2/6/18. Swollen lymph nodes. Symptoms worsening.    Patient seen by Dr. Dillard on 2/6 for swollen lymph nodes for 5 days. Symptoms worsened the day after appointment and have continued to worsen. Started as left occipital swollen lymph node primarily. Now getting more enlarged lymph nodes in other areas and getting bigger and more painful. No fevers - temperature has been a little low. Felt warm last night and temperature was 97.4. This is very concerning to her. Otherwise feels fine. No other symptoms. No cough, congestion, sore throat, myalgias. No recent illness. No known sick contacts. Was in [place] for 10 days. Noticed bump on back of head while there on Friday. No other recent travel. No sick contacts. No animal exposures. Dr. Dillard has recommended labs if not improving and tried to go to  to have them done last night; however, the  doctor refused to order them since the plan in Dr. Dillard's note was to check in 7 days. Patient is frustrated and scared about what is happening.    Reviewed and updated as needed this visit by clinical staff  Tobacco  Allergies  Meds  Problems  Med Hx  Surg Hx  Fam Hx  Soc Hx        Reviewed and updated as needed this visit by Provider  Allergies  Meds  Problems       -------------------------------------    ROS:  Constitutional, HEENT, cardiovascular, pulmonary, gi and gu systems are negative, except as otherwise noted.    OBJECTIVE:                                                    /76 (BP Location: Right arm, Patient Position: Sitting, Cuff Size: Adult Regular)  Pulse 92  Temp 97.5  F (36.4  C) (Tympanic)  Ht 5' 1\" (1.549 m)  Wt 126 lb (57.2 kg)  BMI 23.81 kg/m2   Body mass index is 23.81 kg/(m^2).  General Appearance: healthy, alert and no distress  Eyes:   no discharge, erythema.  Normal pupils.  Head: " 2-2.5 cm tender, lump in left occipital area, mobile. No fluctuance or erythema noted.  ENT: ear canals and TM's normal, and nose and mouth without ulcers or lesions  Neck: Supple.  Tenderness in left anterior and posterior chain with small 5-8 mm lymph nodes palpable. About 1 cm lymph node in right anterior chain. All tender and mobile.   Respiratory: lungs clear to auscultation - no rales, rhonchi or wheezes.  Cardiovascular: regular rate and rhythm, normal S1 S2, no S3 or S4 and no murmur, click or rub.  No peripheral edema.  Abd: soft, NT, No hepatosplenomegaly  Skin: no rashes or lesions.  Well perfused and normal turgor.  Psychiatric: anxious  Lymph: 2-2.5 cm left occipital LN as above. Multiple, small tender nodes in left anterior and posterior cervical chain and one about 1 cm lymph node in right anterior cervical chain. No submandibular, supraclavicular or axillary lymph adenopathy. One about 8 cm lymph node palpable and mildly tendern in right groin.    Diagnostic Test Results:  Results for orders placed or performed in visit on 02/09/18   CBC with platelets differential   Result Value Ref Range    WBC 11.8 (H) 4.0 - 11.0 10e9/L    RBC Count 4.27 3.8 - 5.2 10e12/L    Hemoglobin 13.6 11.7 - 15.7 g/dL    Hematocrit 38.3 35.0 - 47.0 %    MCV 90 78 - 100 fl    MCH 31.9 26.5 - 33.0 pg    MCHC 35.5 31.5 - 36.5 g/dL    RDW 13.6 10.0 - 15.0 %    Platelet Count 287 150 - 450 10e9/L    Diff Method Automated Method     % Neutrophils 76.6 %    % Lymphocytes 13.3 %    % Monocytes 6.0 %    % Eosinophils 3.7 %    % Basophils 0.4 %    Absolute Neutrophil 9.1 (H) 1.6 - 8.3 10e9/L    Absolute Lymphocytes 1.6 0.8 - 5.3 10e9/L    Absolute Monocytes 0.7 0.0 - 1.3 10e9/L    Absolute Eosinophils 0.4 0.0 - 0.7 10e9/L    Absolute Basophils 0.1 0.0 - 0.2 10e9/L   Erythrocyte sedimentation rate auto   Result Value Ref Range    Sed Rate 8 0 - 20 mm/h   Lactate Dehydrogenase   Result Value Ref Range    Lactate Dehydrogenase 159 81 -  234 U/L   CRP inflammation   Result Value Ref Range    CRP Inflammation 5.9 0.0 - 8.0 mg/L        ASSESSMENT/PLAN:                                                      (R59.1) Lymphadenopathy (primary encounter diagnosis)  (D72.829) Leukocytosis, unspecified type   Comment: diffuse, but mostly left occipital and left cervical tender lymphadenopathy most c/w viral infection. Labs showed mild leukocytosis with left shift, which slightly raises my suspicious of bacterial causes even though distribution somewhat atypical. Malignancy less likely with tenderness of nodes and lack of other symptoms. No other unusual exposures   Plan: amoxicillin-clavulanate (AUGMENTIN) 875-125 MG         per tablet  - will treat with 10 days of Augmentin given leukocytosis  - f/u in 1 week if not improving or worsening  - would consider CXR and further lab work (HIV, heterophile, GEOVANI, possibly TB and other labs)    Follow up with Provider - 1 week if not improving     [facility]

## 2018-02-09 NOTE — LETTER
[facility]  [address] [address]                  [phone]   February 9, 2018    Sidra Macedo  [address] [facility] [address]      Dear Sidra,    Here is a summary of your recent test results:    Your white blood cell count was mildly elevated, as we discussed. Your other labs were all normal. If your lymph nodes are not improving by the end of next week, we should see you back in clinic.    Please let me know if you have any questions.    Your test results are enclosed.      Please contact me if you have any questions.           Thank you very much for choosing [facility]    Best regards,    Gita Alexis MD        Results for orders placed or performed in visit on 02/09/18   CBC with platelets differential   Result Value Ref Range    WBC 11.8 (H) 4.0 - 11.0 10e9/L    RBC Count 4.27 3.8 - 5.2 10e12/L    Hemoglobin 13.6 11.7 - 15.7 g/dL    Hematocrit 38.3 35.0 - 47.0 %    MCV 90 78 - 100 fl    MCH 31.9 26.5 - 33.0 pg    MCHC 35.5 31.5 - 36.5 g/dL    RDW 13.6 10.0 - 15.0 %    Platelet Count 287 150 - 450 10e9/L    Diff Method Automated Method     % Neutrophils 76.6 %    % Lymphocytes 13.3 %    % Monocytes 6.0 %    % Eosinophils 3.7 %    % Basophils 0.4 %    Absolute Neutrophil 9.1 (H) 1.6 - 8.3 10e9/L    Absolute Lymphocytes 1.6 0.8 - 5.3 10e9/L    Absolute Monocytes 0.7 0.0 - 1.3 10e9/L    Absolute Eosinophils 0.4 0.0 - 0.7 10e9/L    Absolute Basophils 0.1 0.0 - 0.2 10e9/L   Erythrocyte sedimentation rate auto   Result Value Ref Range    Sed Rate 8 0 - 20 mm/h   Lactate Dehydrogenase   Result Value Ref Range    Lactate Dehydrogenase 159 81 - 234 U/L   CRP inflammation   Result Value Ref Range    CRP Inflammation 5.9 0.0 - 8.0 mg/L

## 2018-02-09 NOTE — NURSING NOTE
"Chief Complaint   Patient presents with     RECHECK       Initial /76 (BP Location: Right arm, Patient Position: Sitting, Cuff Size: Adult Regular)  Pulse 92  Temp 97.5  F (36.4  C) (Tympanic)  Ht 5' 1\" (1.549 m)  Wt 126 lb (57.2 kg)  BMI 23.81 kg/m2 Estimated body mass index is 23.81 kg/(m^2) as calculated from the following:    Height as of this encounter: 5' 1\" (1.549 m).    Weight as of this encounter: 126 lb (57.2 kg).  Medication Reconciliation: complete   Natacha Colon LPN      "

## 2018-02-10 VITALS
BODY MASS INDEX: 23.79 KG/M2 | HEIGHT: 61 IN | DIASTOLIC BLOOD PRESSURE: 76 MMHG | WEIGHT: 126 LBS | SYSTOLIC BLOOD PRESSURE: 138 MMHG | HEART RATE: 92 BPM | TEMPERATURE: 97.5 F

## 2018-03-25 ENCOUNTER — OFFICE VISIT (OUTPATIENT)
Dept: URGENT CARE | Facility: URGENT CARE | Age: 48
End: 2018-03-25
Payer: COMMERCIAL

## 2018-03-25 VITALS
BODY MASS INDEX: 24.75 KG/M2 | WEIGHT: 131 LBS | SYSTOLIC BLOOD PRESSURE: 130 MMHG | HEART RATE: 98 BPM | TEMPERATURE: 98.3 F | DIASTOLIC BLOOD PRESSURE: 70 MMHG | OXYGEN SATURATION: 98 %

## 2018-03-25 DIAGNOSIS — R59.1 LA (LYMPHADENOPATHY): Primary | ICD-10-CM

## 2018-03-25 LAB
ERYTHROCYTE [DISTWIDTH] IN BLOOD BY AUTOMATED COUNT: 12.2 % (ref 10–15)
HCT VFR BLD AUTO: 35.6 % (ref 35–47)
HGB BLD-MCNC: 12.7 G/DL (ref 11.7–15.7)
MCH RBC QN AUTO: 31.8 PG (ref 26.5–33)
MCHC RBC AUTO-ENTMCNC: 35.7 G/DL (ref 31.5–36.5)
MCV RBC AUTO: 89 FL (ref 78–100)
PLATELET # BLD AUTO: 262 10E9/L (ref 150–450)
RBC # BLD AUTO: 4 10E12/L (ref 3.8–5.2)
WBC # BLD AUTO: 10.7 10E9/L (ref 4–11)

## 2018-03-25 PROCEDURE — 85027 COMPLETE CBC AUTOMATED: CPT | Performed by: PHYSICIAN ASSISTANT

## 2018-03-25 PROCEDURE — 36415 COLL VENOUS BLD VENIPUNCTURE: CPT | Performed by: PHYSICIAN ASSISTANT

## 2018-03-25 PROCEDURE — 99213 OFFICE O/P EST LOW 20 MIN: CPT | Performed by: PHYSICIAN ASSISTANT

## 2018-03-25 NOTE — MR AVS SNAPSHOT
"              After Visit Summary   3/25/2018    Sidra Macedo    MRN: [id number]           Patient Information     Date Of Birth          1970        Visit Information        Provider Department      3/25/2018 4:55 PM Varun Urbina PA-C [facility]        Today's Diagnoses     LA (lymphadenopathy)    -  1       Follow-ups after your visit        Who to contact     If you have questions or need follow up information about today's clinic visit or your schedule please contact [facility] directly at [phone].  Normal or non-critical lab and imaging results will be communicated to you by ID Analyticshart, letter or phone within 4 business days after the clinic has received the results. If you do not hear from us within 7 days, please contact the clinic through EduKartt or phone. If you have a critical or abnormal lab result, we will notify you by phone as soon as possible.  Submit refill requests through [employer] or call your pharmacy and they will forward the refill request to us. Please allow 3 business days for your refill to be completed.          Additional Information About Your Visit        MyChart Information     [employer] lets you send messages to your doctor, view your test results, renew your prescriptions, schedule appointments and more. To sign up, go to [email or web address] . Click on \"Log in\" on the left side of the screen, which will take you to the Welcome page. Then click on \"Sign up Now\" on the right side of the page.     You will be asked to enter the access code listed below, as well as some personal information. Please follow the directions to create your username and password.     Your access code is: [email or web address]  Expires: 2018 12:46 PM     Your access code will  in 90 days. If you need help or a new code, please call your [place] clinic or [phone].        Care EveryWhere ID     This is your Care EveryWhere ID. This could be used by " other organizations to access your [place] medical records  [id number]        Your Vitals Were     Pulse Temperature Pulse Oximetry BMI (Body Mass Index)          98 98.3  F (36.8  C) (Tympanic) 98% 24.75 kg/m2         Blood Pressure from Last 3 Encounters:   03/25/18 130/70   02/09/18 138/76   02/06/18 130/70    Weight from Last 3 Encounters:   03/25/18 131 lb (59.4 kg)   02/09/18 126 lb (57.2 kg)   02/06/18 126 lb (57.2 kg)              We Performed the Following     CBC with platelets        Primary Care Provider Office Phone # Fax #    Mirna Dillard [phone]       [address] [place] [address]        Equal Access to Services     [facility] ROSIE : Hadii rosy grijalva Soawilda, waaxda luqadaha, qaybta kaalmada adeegyamelba, fay kumar . So [facility] [phone].    ATENCIÓN: Si habla español, tiene a aguilar disposición servicios gratuitos de asistencia lingüística. [facility] [phone].    We comply with applicable federal civil rights laws and [place] laws. We do not discriminate on the basis of race, color, national origin, age, disability, sex, sexual orientation, or gender identity.            Thank you!     [facility]  for your care. Our goal is always to provide you with excellent care. Hearing back from our patients is one way we can continue to improve our services. Please take a few minutes to complete the written survey that you may receive in the mail after your visit with us. Thank you!             Your Updated Medication List - Protect others around you: Learn how to safely use, store and throw away your medicines at www.disposemymeds.org.          This list is accurate as of 3/25/18 11:59 PM.  Always use your most recent med list.                   Brand Name Dispense Instructions for use Diagnosis    VITAMIN B 12 PO           VITAMIN D (CHOLECALCIFEROL) PO      Take by mouth daily         VITAMIN E PO

## 2018-03-28 NOTE — PROGRESS NOTES
SUBJECTIVE:  Sidra Macedo is a 48 year old female who presents to the clinic today with a chief complaint of lymphadenopathy in occipital region.  Has been treated in the past with antiobiotics.     Currently no evidence of infection    Past Medical History:   Diagnosis Date     Headache(784.0)     intermittent migraines, treats with tylenol     Molar pregnancy        Current Outpatient Prescriptions   Medication Sig Dispense Refill     Cyanocobalamin (VITAMIN B 12 PO)        VITAMIN D, CHOLECALCIFEROL, PO Take by mouth daily       VITAMIN E PO          Social History   Substance Use Topics     Smoking status: Current Every Day Smoker     Packs/day: 1.00     Years: 30.00     Last attempt to quit: 8/1/2005     Smokeless tobacco: Never Used     Alcohol use No       ROS  Review of systems negative except as stated above.    OBJECTIVE:  /70 (Cuff Size: Adult Regular)  Pulse 98  Temp 98.3  F (36.8  C) (Tympanic)  Wt 131 lb (59.4 kg)  SpO2 98%  BMI 24.75 kg/m2  GENERAL APPEARANCE: healthy, alert and no distress  EYES: EOMI,  PERRL, conjunctiva clear  HENT: ear canals and TM's normal.  Nose and mouth without ulcers, erythema or lesions  NECK: supple, nontender, no lymphadenopathy  RESP: lungs clear to auscultation - no rales, rhonchi or wheezes  CV: regular rates and rhythm, normal S1 S2, no murmur noted  ABDOMEN:  soft, nontender, no HSM or masses and bowel sounds normal  NEURO: Normal strength and tone, sensory exam grossly normal,  normal speech and mentation  SKIN: no suspicious lesions or rashes    Results for orders placed or performed in visit on 03/25/18   CBC with platelets   Result Value Ref Range    WBC 10.7 4.0 - 11.0 10e9/L    RBC Count 4.00 3.8 - 5.2 10e12/L    Hemoglobin 12.7 11.7 - 15.7 g/dL    Hematocrit 35.6 35.0 - 47.0 %    MCV 89 78 - 100 fl    MCH 31.8 26.5 - 33.0 pg    MCHC 35.7 31.5 - 36.5 g/dL    RDW 12.2 10.0 - 15.0 %    Platelet Count 262 150 - 450 10e9/L         ASSESSMENT:    (R59.1) LA (lymphadenopathy)  (primary encounter diagnosis)  Comment: No symptoms. WBC WNL  Plan: CBC with platelets        Follow up if symptoms appear,  [place], or persist

## 2018-04-28 ENCOUNTER — OFFICE VISIT (OUTPATIENT)
Dept: URGENT CARE | Facility: URGENT CARE | Age: 48
End: 2018-04-28
Payer: COMMERCIAL

## 2018-04-28 VITALS
HEART RATE: 92 BPM | DIASTOLIC BLOOD PRESSURE: 78 MMHG | BODY MASS INDEX: 23.05 KG/M2 | SYSTOLIC BLOOD PRESSURE: 102 MMHG | TEMPERATURE: 97.7 F | OXYGEN SATURATION: 100 % | WEIGHT: 122 LBS

## 2018-04-28 DIAGNOSIS — H60.392 INFECTIVE OTITIS EXTERNA, LEFT: Primary | ICD-10-CM

## 2018-04-28 PROCEDURE — 99213 OFFICE O/P EST LOW 20 MIN: CPT | Performed by: PHYSICIAN ASSISTANT

## 2018-04-28 RX ORDER — CIPROFLOXACIN AND DEXAMETHASONE 3; 1 MG/ML; MG/ML
4 SUSPENSION/ DROPS AURICULAR (OTIC) 2 TIMES DAILY
Qty: 7.5 ML | Refills: 0 | Status: SHIPPED | OUTPATIENT
Start: 2018-04-28 | End: 2018-05-05

## 2018-04-28 NOTE — MR AVS SNAPSHOT
After Visit Summary   4/28/2018    Sidra Macedo    MRN: [id number]           Patient Information     Date Of Birth          1970        Visit Information        Provider Department      4/28/2018 10:50 AM Arturo Zimmerman PA-C [facility]        Today's Diagnoses     Infective otitis externa, left    -  1      Care Instructions      External Ear Infection (Adult)    External otitis (also called  swimmer s ear ) is an infection in the ear canal. It is often caused by bacteria or fungus. It can occur a few days after water gets trapped in the ear canal (from swimming or bathing). It can also occur after cleaning too deeply in the ear canal with a cotton swab or other object. Sometimes, hair care products get into the ear canal and cause this problem.  Symptoms can include pain, fever, itching, redness, drainage, or swelling of the ear canal. Temporary hearing loss may also occur.  Home care    Do not try to clean the ear canal. This can push pus and bacteria deeper into the canal.    Use prescribed ear drops as directed. These help reduce swelling and fight the infection. If an ear wick was placed in the ear canal, apply drops right onto the end of the wick. The wick will draw the medicine into the ear canal even if it is swollen closed.    A cotton ball may be loosely placed in the outer ear to absorb any drainage.    You may use acetaminophen or ibuprofen to control pain, unless another medicine was prescribed. Note: If you have chronic liver or kidney disease or ever had a stomach ulcer or GI bleeding, talk to your healthcare provider before taking any of these medicines.    Do not allow water to get into your ear when bathing. Also, don't swim until the infection has cleared.  Prevention    Keep your ears dry. This helps lower the risk of infection. Dry your ears with a towel or hair dryer after getting wet. Also, use ear plugs when swimming.    Do not stick any  objects in the ear to remove wax.    If you feel water trapped in your ear, use ear drops right away. You can get these drops over the counter at most drugstores. They work by removing water from the ear canal.  Follow-up care  Follow up with your healthcare provider in 1 week, or as advised.  When to seek medical advice  Call your healthcare provider right away if any of these occur:    Ear pain becomes worse or doesn t improve after 3 days of treatment    Redness or swelling of the outer ear occurs or gets worse    Headache    Painful or stiff neck    Drowsiness or confusion    Fever of 100.4 F (38 C) or higher, or as directed by your healthcare provider    Seizure  Date Last Reviewed: 10/1/2017    5478-1830 [employer]. [address] [place] [address], [place] [address]. All rights reserved. This information is not intended as a substitute for professional medical care. Always follow your healthcare professional's instructions.                Follow-ups after your visit        Who to contact     If you have questions or need follow up information about today's clinic visit or your schedule please contact [facility] directly at [phone].  Normal or non-critical lab and imaging results will be communicated to you by Backplanehart, letter or phone within 4 business days after the clinic has received the results. If you do not hear from us within 7 days, please contact the clinic through Astro Gamingt or phone. If you have a critical or abnormal lab result, we will notify you by phone as soon as possible.  Submit refill requests through Classiqs or call your pharmacy and they will forward the refill request to us. Please allow 3 business days for your refill to be completed.          Additional Information About Your Visit        Classiqs Information     Classiqs lets you send messages to your doctor, view your test results, renew your prescriptions, schedule appointments and more. To sign up, go to  [email or web address] . Click on \"Log in\" on the left side of the screen, which will take you to the Welcome page. Then click on \"Sign up Now\" on the right side of the page.     You will be asked to enter the access code listed below, as well as some personal information. Please follow the directions to create your username and password.     Your access code is: [email or web address]  Expires: 2018 12:46 PM     Your access code will  in 90 days. If you need help or a new code, please call your [place] clinic or [phone].        Care EveryWhere ID     This is your Care EveryWhere ID. This could be used by other organizations to access your [place] medical records  [id number]        Your Vitals Were     Pulse Temperature Pulse Oximetry BMI (Body Mass Index)          92 97.7  F (36.5  C) (Tympanic) 100% 23.05 kg/m2         Blood Pressure from Last 3 Encounters:   18 102/78   18 130/70   18 138/76    Weight from Last 3 Encounters:   18 122 lb (55.3 kg)   18 131 lb (59.4 kg)   18 126 lb (57.2 kg)              Today, you had the following     No orders found for display         Today's Medication Changes          These changes are accurate as of 18 11:19 AM.  If you have any questions, ask your nurse or doctor.               Start taking these medicines.        Dose/Directions    ciprofloxacin-dexamethasone otic suspension   Commonly known as:  CIPRODEX   Used for:  Infective otitis externa, left   Started by:  Arturo Zimmerman PA-C        Dose:  4 drop   Place 4 drops Into the left ear 2 times daily for 7 days   Quantity:  7.5 mL   Refills:  0            Where to get your medicines      These medications were sent to [id number] - [place], [place] -  [address] AT [facility] [address]   [address] [place] [address]     Phone:  [phone]     ciprofloxacin-dexamethasone otic suspension                Primary Care Provider " Office Phone # Fax #    Mirna Dillard [phone] [address] [place] [address]        Equal Access to Services     [facility] ROSIE : Hadkia rosy west rudi Young, wamary ellenda luqadaha, qaybta kahowardda cesar, fay wharton lasarojsameer chauncey. So [facility] [phone].    ATENCIÓN: Si habla español, tiene a aguilar disposición servicios gratuitos de asistencia lingüística. Llame al [phone].    We comply with applicable federal civil rights laws and [place] laws. We do not discriminate on the basis of race, color, national origin, age, disability, sex, sexual orientation, or gender identity.            Thank you!     Thank you for choosing [facility] [place] [facility]  for your care. Our goal is always to provide you with excellent care. Hearing back from our patients is one way we can continue to improve our services. Please take a few minutes to complete the written survey that you may receive in the mail after your visit with us. Thank you!             Your Updated Medication List - Protect others around you: Learn how to safely use, store and throw away your medicines at www.disposemymeds.org.          This list is accurate as of 4/28/18 11:19 AM.  Always use your most recent med list.                   Brand Name Dispense Instructions for use Diagnosis    ciprofloxacin-dexamethasone otic suspension    CIPRODEX    7.5 mL    Place 4 drops Into the left ear 2 times daily for 7 days    Infective otitis externa, left       VITAMIN B 12 PO           VITAMIN D (CHOLECALCIFEROL) PO      Take by mouth daily        VITAMIN E PO

## 2018-04-28 NOTE — PATIENT INSTRUCTIONS
External Ear Infection (Adult)    External otitis (also called  swimmer s ear ) is an infection in the ear canal. It is often caused by bacteria or fungus. It can occur a few days after water gets trapped in the ear canal (from swimming or bathing). It can also occur after cleaning too deeply in the ear canal with a cotton swab or other object. Sometimes, hair care products get into the ear canal and cause this problem.  Symptoms can include pain, fever, itching, redness, drainage, or swelling of the ear canal. Temporary hearing loss may also occur.  Home care    Do not try to clean the ear canal. This can push pus and bacteria deeper into the canal.    Use prescribed ear drops as directed. These help reduce swelling and fight the infection. If an ear wick was placed in the ear canal, apply drops right onto the end of the wick. The wick will draw the medicine into the ear canal even if it is swollen closed.    A cotton ball may be loosely placed in the outer ear to absorb any drainage.    You may use acetaminophen or ibuprofen to control pain, unless another medicine was prescribed. Note: If you have chronic liver or kidney disease or ever had a stomach ulcer or GI bleeding, talk to your healthcare provider before taking any of these medicines.    Do not allow water to get into your ear when bathing. Also, don't swim until the infection has cleared.  Prevention    Keep your ears dry. This helps lower the risk of infection. Dry your ears with a towel or hair dryer after getting wet. Also, use ear plugs when swimming.    Do not stick any objects in the ear to remove wax.    If you feel water trapped in your ear, use ear drops right away. You can get these drops over the counter at most drugstores. They work by removing water from the ear canal.  Follow-up care  Follow up with your healthcare provider in 1 week, or as advised.  When to seek medical advice  Call your healthcare provider right away if any of these  occur:    Ear pain becomes worse or doesn t improve after 3 days of treatment    Redness or swelling of the outer ear occurs or gets worse    Headache    Painful or stiff neck    Drowsiness or confusion    Fever of 100.4 F (38 C) or higher, or as directed by your healthcare provider    Seizure  Date Last Reviewed: 10/1/2017    7972-5806 [employer]. [address] [place] [address]. All rights reserved. This information is not intended as a substitute for professional medical care. Always follow your healthcare professional's instructions.

## 2018-04-28 NOTE — PROGRESS NOTES
"SUBJECTIVE:    Sidra Macedo is a 48 y.o. Woman who presents to  today for c/o left ear pain x 3 days duration. No drainage. No fever. No cough, nasal congestion or other URI sxs.     ROS:     HEENT: Left ear pain. No nasal congestion or ST   RESP: No cough, wheezing or SOB   GI: Denies any N/V/D. No abdominal pain. Normal BM's  SKIN: Denies rash  NEURO: Denies any HA or neck stiffness       Past Medical History:   Diagnosis Date     Headache(784.0)     intermittent migraines, treats with tylenol     Molar pregnancy      No Known Allergies            OBJECTIVE:  /78  Pulse 92  Temp 97.7  F (36.5  C) (Tympanic)  Wt 122 lb (55.3 kg)  SpO2 100%  BMI 23.05 kg/m2    General appearance: alert and no apparent distress  Skin color is pink and without rash.  HEENT:   Conjunctiva not injected.  Sclera clear.  LEFT EAR: Positive tenderness with manipulation of tragus. Positive erythematous canal with bacterial debris.   Left TM is normal: no effusions, no erythema, and normal landmarks. No periauricular swelling. No facial or neck swelling.   Right TM is normal: no effusions, no erythema, and normal landmarks.  Nasal mucosa is normal.  Oropharyngeal exam is normal: no lesions, erythema, adenopathy or exudate.  Neck is supple with no adenopathy  CARDIAC:NORMAL - regular rate and rhythm without murmur.  RESP: Normal - CTA without rales, rhonchi, or wheezing.    ASSESSMENT/PLAN:    (H60.392) Infective otitis externa, left  (primary encounter diagnosis)  Plan: ciprofloxacin-dexamethasone (CIPRODEX) otic         suspension    1. Abx gtts   2. Follow-up with PCP if sxs change, worsen or fail to fully resolve with above tx.  3.  In addition to the above, OE  \"red flag\" signs and sxs are reviewed with pt both verbally and by way of printed educational material for home review.  Pt verbalizes understanding of and agrees to the above plan.             "

## 2019-04-30 ENCOUNTER — OFFICE VISIT (OUTPATIENT)
Dept: URGENT CARE | Facility: URGENT CARE | Age: 49
End: 2019-04-30
Payer: COMMERCIAL

## 2019-04-30 VITALS
HEART RATE: 89 BPM | SYSTOLIC BLOOD PRESSURE: 120 MMHG | OXYGEN SATURATION: 99 % | RESPIRATION RATE: 18 BRPM | WEIGHT: 115 LBS | BODY MASS INDEX: 21.73 KG/M2 | TEMPERATURE: 98 F | DIASTOLIC BLOOD PRESSURE: 64 MMHG

## 2019-04-30 DIAGNOSIS — J01.11 RECURRENT FRONTAL SINUSITIS: Primary | ICD-10-CM

## 2019-04-30 DIAGNOSIS — J01.01 ACUTE RECURRENT MAXILLARY SINUSITIS: ICD-10-CM

## 2019-04-30 PROCEDURE — 99214 OFFICE O/P EST MOD 30 MIN: CPT | Performed by: PHYSICIAN ASSISTANT

## 2019-04-30 NOTE — PATIENT INSTRUCTIONS
1.  Plenty of fluids, rest, warm compresses on face  2.  Mucinex twice daily for at least 4 days  3.  Citlalli Pot 1x in the morning 1x at night (SALINE MIST SPRAY IS AN ACCEPTABLE, THOUGH NOT AS EFFECTIVE REPLACEMENT)  4.  Benadryl (diphenhydramine) at bedtime   5.  Either Claritin (Loratadine), Allegra (Fexofenadine), or Zyrtec (Cetirizine) in the day  6.  Flonase (Fluticasone) 2x each nostril twice a day for two weeks, then once each nostril once a day           Patient Education     Self-Care for Sinusitis     Drinking plenty of water can help sinuses drain.   Sinusitis can often be managed with self-care. Self-care can keep sinuses moist and make you feel more comfortable. Remember to follow your doctor's instructions closely. This can make a big difference in getting your sinus problem under control.  Drink fluids  Drinking extra fluids helps thin your mucus. This lets it drain from your sinuses more easily. Have a glass of water every hour or two. A humidifier helps in much the same way. Fluids can also offset the drying effects of certain medicines. If you use a humidifier, follow the product maker's instructions on how to use it. Clean it on a regular schedule.  Use saltwater rinses  Rinses help keep your sinuses and nose moist. Mix a teaspoon of salt in 8 ounces of fresh, warm water. Use a bulb syringe to gently squirt the water into your nose a few times a day. You can also buy ready-made saline nasal sprays.  Apply hot or cold packs  Applying heat to the area surrounding your sinuses may make you feel more comfortable. Use a hot water bottle or a hand towel dipped in hot water. Some people also find ice packs effective for relieving pain.  Medicines  Your doctor may prescribe medications to help treat your sinusitis. If you have an infection, antibiotics can help clear it up. If you are prescribed antibiotics, take all pills on schedule until they are gone, even if you feel better. Decongestants help  relieve swelling. Use decongestant sprays for short periods only under the direction of your doctor. If you have allergies, your doctor may prescribe medications to help relieve them.   Date Last Reviewed: 10/1/2016    0683-0977 [employer]. [address] [place] [address], [place] [address]. All rights reserved. This information is not intended as a substitute for professional medical care. Always follow your healthcare professional's instructions.

## 2019-05-09 NOTE — PROGRESS NOTES
SUBJECTIVE:  Sidra Macedo is a 49 year old female here with concerns about sinus infection.  She states onset of symptoms were 2 week(s) ago.  She has had maxillary, frontal pressure. Course of illness is worsening. Severity moderate  Current and Associated symptoms: nasal congestion, rhinorrhea, facial pain/pressure, tooth pain and headache  Predisposing factors include HX of recurrent sinusitis. Recent treatment has included: Antihistamine, Decongestants, OTC meds and Steroid nasal spray    Past Medical History:   Diagnosis Date     Headache(784.0)     intermittent migraines, treats with tylenol     Molar pregnancy      Social History     Tobacco Use     Smoking status: Light Tobacco Smoker     Packs/day: 1.00     Years: 30.00     Pack years: 30.00     Types: Cigarettes     Last attempt to quit: 2005     Years since quittin.7     Smokeless tobacco: Never Used   Substance Use Topics     Alcohol use: No     Alcohol/week: 0.0 oz       ROS:  10 point ROS negative except as listed above          OBJECTIVE:  /64 (BP Location: Right arm, Patient Position: Sitting, Cuff Size: Adult Small)   Pulse 89   Temp 98  F (36.7  C)   Resp 18   Wt 52.2 kg (115 lb)   SpO2 99%   BMI 21.73 kg/m    Exam:GENERAL APPEARANCE: healthy, alert and no distress  EYES: EOMI,  PERRL, conjunctiva clear  HENT: ear canals and TM's normal.  Nose and mouth without ulcers, erythema or lesions  NECK: supple, nontender, no lymphadenopathy  RESP: lungs clear to auscultation - no rales, rhonchi or wheezes  CV: regular rates and rhythm, normal S1 S2, no murmur noted  ABDOMEN:  soft, nontender, no HSM or masses and bowel sounds normal  NEURO: Normal strength and tone, sensory exam grossly normal,  normal speech and mentation  SKIN: no suspicious lesions or rashes      ASSESSMENT:  (J01.11) Recurrent frontal sinusitis  (primary encounter diagnosis)  (J01.01) Acute recurrent maxillary sinusitis  Plan: amoxicillin-clavulanate (AUGMENTIN)  875-125 MG         tablet, OTOLARYNGOLOGY REFERRAL    Patient Instructions         1.  Plenty of fluids, rest, warm compresses on face  2.  Mucinex twice daily for at least 4 days  3.  Citlalli Pot 1x in the morning 1x at night (SALINE MIST SPRAY IS AN ACCEPTABLE, THOUGH NOT AS EFFECTIVE REPLACEMENT)  4.  Benadryl (diphenhydramine) at bedtime   5.  Either Claritin (Loratadine), Allegra (Fexofenadine), or Zyrtec (Cetirizine) in the day  6.  Flonase (Fluticasone) 2x each nostril twice a day for two weeks, then once each nostril once a day           Patient Education     Self-Care for Sinusitis     Drinking plenty of water can help sinuses drain.   Sinusitis can often be managed with self-care. Self-care can keep sinuses moist and make you feel more comfortable. Remember to follow your doctor's instructions closely. This can make a big difference in getting your sinus problem under control.  Drink fluids  Drinking extra fluids helps thin your mucus. This lets it drain from your sinuses more easily. Have a glass of water every hour or two. A humidifier helps in much the same way. Fluids can also offset the drying effects of certain medicines. If you use a humidifier, follow the product maker's instructions on how to use it. Clean it on a regular schedule.  Use saltwater rinses  Rinses help keep your sinuses and nose moist. Mix a teaspoon of salt in 8 ounces of fresh, warm water. Use a bulb syringe to gently squirt the water into your nose a few times a day. You can also buy ready-made saline nasal sprays.  Apply hot or cold packs  Applying heat to the area surrounding your sinuses may make you feel more comfortable. Use a hot water bottle or a hand towel dipped in hot water. Some people also find ice packs effective for relieving pain.  Medicines  Your doctor may prescribe medications to help treat your sinusitis. If you have an infection, antibiotics can help clear it up. If you are prescribed antibiotics, take all pills  on schedule until they are gone, even if you feel better. Decongestants help relieve swelling. Use decongestant sprays for short periods only under the direction of your doctor. If you have allergies, your doctor may prescribe medications to help relieve them.   Date Last Reviewed: 10/1/2016    7599-3908 [employer] [address] [place] [address], [place] [address]. All rights reserved. This information is not intended as a substitute for professional medical care. Always follow your healthcare professional's instructions.

## 2020-08-30 ENCOUNTER — VIRTUAL VISIT (OUTPATIENT)
Dept: FAMILY MEDICINE | Facility: OTHER | Age: 50
End: 2020-08-30
Payer: COMMERCIAL

## 2020-08-30 PROCEDURE — 99421 OL DIG E/M SVC 5-10 MIN: CPT | Performed by: FAMILY MEDICINE

## 2020-08-31 ENCOUNTER — ANCILLARY PROCEDURE (OUTPATIENT)
Dept: GENERAL RADIOLOGY | Facility: CLINIC | Age: 50
End: 2020-08-31
Attending: PHYSICIAN ASSISTANT
Payer: COMMERCIAL

## 2020-08-31 ENCOUNTER — OFFICE VISIT (OUTPATIENT)
Dept: URGENT CARE | Facility: URGENT CARE | Age: 50
End: 2020-08-31
Payer: COMMERCIAL

## 2020-08-31 ENCOUNTER — VIRTUAL VISIT (OUTPATIENT)
Dept: FAMILY MEDICINE | Facility: OTHER | Age: 50
End: 2020-08-31

## 2020-08-31 VITALS
HEART RATE: 100 BPM | SYSTOLIC BLOOD PRESSURE: 130 MMHG | TEMPERATURE: 98.4 F | BODY MASS INDEX: 21.73 KG/M2 | DIASTOLIC BLOOD PRESSURE: 66 MMHG | OXYGEN SATURATION: 97 % | WEIGHT: 115 LBS

## 2020-08-31 DIAGNOSIS — Z20.822 SUSPECTED COVID-19 VIRUS INFECTION: Primary | ICD-10-CM

## 2020-08-31 DIAGNOSIS — R50.9 FEVER, UNSPECIFIED FEVER CAUSE: ICD-10-CM

## 2020-08-31 DIAGNOSIS — R05.9 COUGH: Primary | ICD-10-CM

## 2020-08-31 DIAGNOSIS — R05.9 COUGH: ICD-10-CM

## 2020-08-31 PROCEDURE — 99214 OFFICE O/P EST MOD 30 MIN: CPT | Performed by: PHYSICIAN ASSISTANT

## 2020-08-31 PROCEDURE — 71046 X-RAY EXAM CHEST 2 VIEWS: CPT

## 2020-08-31 PROCEDURE — U0003 INFECTIOUS AGENT DETECTION BY NUCLEIC ACID (DNA OR RNA); SEVERE ACUTE RESPIRATORY SYNDROME CORONAVIRUS 2 (SARS-COV-2) (CORONAVIRUS DISEASE [COVID-19]), AMPLIFIED PROBE TECHNIQUE, MAKING USE OF HIGH THROUGHPUT TECHNOLOGIES AS DESCRIBED BY CMS-2020-01-R: HCPCS | Performed by: PHYSICIAN ASSISTANT

## 2020-08-31 RX ORDER — NITROFURANTOIN 25; 75 MG/1; MG/1
100 CAPSULE ORAL 2 TIMES DAILY
COMMUNITY
End: 2020-09-13

## 2020-08-31 RX ORDER — ALBUTEROL SULFATE 90 UG/1
2 AEROSOL, METERED RESPIRATORY (INHALATION) EVERY 4 HOURS PRN
Qty: 18 G | Refills: 0 | Status: SHIPPED | OUTPATIENT
Start: 2020-08-31 | End: 2020-09-13

## 2020-08-31 NOTE — PROGRESS NOTES
"Date: 2020 17:00:12  Clinician: Juan Echevarria  Clinician NPI: [id number]  Patient: Sidra Macedo  Patient : 1970  Patient Address: [facility] [address] [place] [id number]  Patient Phone: [phone]  Visit Protocol: URI  Patient Summary:  Sidra is a 50 year old ( : 1970 ) female who initiated a Visit for COVID-19 (Coronavirus) evaluation and screening. When asked the question \"Please sign me up to receive news, health information and promotions from [employer].\", Sidra responded \"No\".    Sidra states her symptoms started gradually 3-4 days ago.   Her symptoms consist of a headache, malaise, diarrhea, a cough, nausea, myalgia, and anosmia. Sidra also feels feverish but was unable to measure her temperature.   Symptom details     Cough: Sidra coughs a few times an hour and her cough is more bothersome at night. Phlegm does not come into her throat when she coughs. She does not believe her cough is caused by post-nasal drip.     Headache: She states the headache is mild (1-3 on a 10 point pain scale).      Sidra denies having ear pain, chills, wheezing, sore throat, teeth pain, ageusia, nasal congestion, vomiting, rhinitis, and facial pain or pressure. She also denies having recent facial or sinus surgery in the past 60 days and double sickening (worsening symptoms after initial improvement). She is not experiencing dyspnea.   Precipitating events  She has not recently been exposed to someone with influenza. Sidra has been in close contact with the following high risk individuals: adults 65 or older.   Pertinent COVID-19 (Coronavirus) information  In the past 14 days, Sidra has not worked in a congregate living setting.   She does not work or volunteer as healthcare worker or a  and does not work or volunteer in a healthcare facility.   Sidra also has not lived in a congregate living setting in the past 14 days. She does not live with a healthcare worker.   Sidra has not had a " close contact with a laboratory-confirmed COVID-19 patient within 14 days of symptom onset.   Since December 2019, Sidra and has not had upper respiratory infection or influenza-like illness. Has not been diagnosed with lab-confirmed COVID-19 test   Pertinent medical history  Sidra had 1 sinus infection within the past year.   Sidra has taken an antibiotic medication in the past month. Antibiotic details as reported by the patient (free text): Nitrofurantoin   Sidra does not get yeast infections when she takes antibiotics.   Sidra does not need a return to work/school note.   Weight: 115 lbs   Sidra smokes or uses smokeless tobacco.   Additional information as reported by the patient (free text): my chest feels tight and burns super bad when i cough   Weight: 115 lbs    MEDICATIONS: nitrofurantoin oral, ALLERGIES: NKDA  Clinician Response:  Dear Sidra,   Your symptoms show that you may have coronavirus (COVID-19). This illness can cause fever, cough and trouble breathing. Many people get a mild case and get better on their own. Some people can get very sick.  What should I do?  We would like to test you for this virus.   1. Please call [phone] to schedule your visit. Explain that you were referred by [facility] to have a COVID-19 test. Be ready to share your [facility] visit ID number.  The following will serve as your written order for this COVID Test, ordered by me, for the indication of suspected COVID [Z20.828]: The test will be ordered in [employer], our electronic health record, after you are scheduled. It will show as ordered and authorized by Isaac Monsivais MD.  Order: COVID-19 (Coronavirus) PCR for SYMPTOMATIC testing from [facility].      2. When it's time for your COVID test:  Stay at least 6 feet away from others. (If someone will drive you to your test, stay in the backseat, as far away from the  as you can.)   Cover your mouth and nose with a mask, tissue or washcloth.  Go straight to the testing site. Don't  "make any stops on the way there or back.      3.Starting now: Stay home and away from others (self-isolate) until:   You've had no fever---and no medicine that reduces fever---for one full day (24 hours). And...   Your other symptoms have gotten better. For example, your cough or breathing has improved. And...   At least 10 days have passed since your symptoms started.       During this time, don't leave the house except for testing or medical care.   Stay in your own room, even for meals. Use your own bathroom if you can.   Stay away from others in your home. No hugging, kissing or shaking hands. No visitors.  Don't go to work, school or anywhere else.    Clean \"high touch\" surfaces often (doorknobs, counters, handles, etc.). Use a household cleaning spray or wipes. You'll find a full list of  on the EPA website: www.epa.gov/pesticide-registration/list-n-disinfectants-use-against-sars-cov-2.   Cover your mouth and nose with a mask, tissue or washcloth to avoid spreading germs.  Wash your hands and face often. Use soap and water.  Caregivers in these groups are at risk for severe illness due to COVID-19:  o People 65 years and older  o People who live in a nursing home or long-term care facility  o People with chronic disease (lung, heart, cancer, diabetes, kidney, liver, immunologic)  o People who have a weakened immune system, including those who:   Are in cancer treatment  Take medicine that weakens the immune system, such as corticosteroids  Had a bone marrow or organ transplant  Have an immune deficiency  Have poorly controlled HIV or AIDS  Are obese (body mass index of 40 or higher)  Smoke regularly   o Caregivers should wear gloves while washing dishes, handling laundry and cleaning bedrooms and bathrooms.  o Use caution when washing and drying laundry: Don't shake dirty laundry, and use the warmest water setting that you can.  o For more tips, go to " www.cdc.gov/coronavirus/2019-ncov/downloads/10Things.pdf.    4.Sign up for [employer]. We know it's scary to hear that you might have COVID-19. We want to track your symptoms to make sure you're okay over the next 2 weeks. Please look for an email from [employer]---this is a free, online program that we'll use to keep in touch. To sign up, follow the link in the email. Learn more at [email or web address]  How can I take care of myself?   Get lots of rest. Drink extra fluids (unless a doctor has told you not to).   Take Tylenol (acetaminophen) for fever or pain. If you have liver or kidney problems, ask your family doctor if it's okay to take Tylenol.   Adults can take either:    650 mg (two 325 mg pills) every 4 to 6 hours, or...   1,000 mg (two 500 mg pills) every 8 hours as needed.    Note: Don't take more than 3,000 mg in one day. Acetaminophen is found in many medicines (both prescribed and over-the-counter medicines). Read all labels to be sure you don't take too much.   For children, check the Tylenol bottle for the right dose. The dose is based on the child's age or weight.    If you have other health problems (like cancer, heart failure, an organ transplant or severe kidney disease): Call your specialty clinic if you don't feel better in the next 2 days.       Know when to call 911. Emergency warning signs include:    Trouble breathing or shortness of breath Pain or pressure in the chest that doesn't go away Feeling confused like you haven't felt before, or not being able to wake up Bluish-colored lips or face.  Where can I get more information?    [facility] -- About COVID-19: [email or web address]   CDC -- What to Do If You're Sick: www.cdc.gov/coronavirus/2019-ncov/about/steps-when-sick.html   CDC -- Ending Home Isolation: www.cdc.gov/coronavirus/2019-ncov/hcp/disposition-in-home-patients.html   CDC -- Caring for Someone:  www.cdc.gov/coronavirus/2019-ncov/if-you-are-sick/care-for-someone.html   [facility] -- Interim Guidance for Hospital Discharge to Home: www.health.[facility].mn.us/diseases/coronavirus/hcp/hospdischarge.pdf   [facility] clinical trials (COVID-19 research studies): clinicalaffairs.[facility]-clinical-trials    Below are the COVID-19 hotlines at the [place] Department of [facility] ([facility]). Interpreters are available.    For health questions: Call 982-406-7600 or 1-198.607.7087 (7 a.m. to 7 p.m.) For questions about schools and childcare: Call 623-297-9047 or 1-149.336.1647 (7 a.m. to 7 p.m.)    Diagnosis: Other malaise  Diagnosis ICD: R53.81

## 2020-09-01 NOTE — PROGRESS NOTES
"Date: 2020 18:00:23  Clinician: Tuan Garces  Clinician NPI: [id number]  Patient: Sidra Macedo  Patient : 1970  Patient Address: [address],[place] [address]  Patient Phone: [phone]  Visit Protocol: URI  Patient Summary:  Sidra is a 50 year old ( : 1970 ) female who initiated a Visit for cold, sinus infection, or influenza. When asked the question \"Please sign me up to receive news, health information and promotions from [employer].\", Sidra responded \"No\".    Sidra states her symptoms started today.   Her symptoms consist of a headache, chills, malaise, wheezing, diarrhea, a cough, nasal congestion, rhinitis, nausea, myalgia, anosmia, and facial pain or pressure. She is experiencing mild difficulty breathing with activities but can speak normally in full sentences. Sidra also feels feverish but was unable to measure her temperature.   Symptom details     Nasal secretions: The color of her mucus is blood-tinged and green.    Cough: Sidra coughs a few times an hour and her cough is more bothersome at night. Phlegm comes into her throat when she coughs. She does not believe her cough is caused by post-nasal drip. The color of the phlegm is green and blood-tinged.     Wheezing: Sidra has not ever been diagnosed with asthma. Additional wheezing details as reported by the patient (free text): It doesnt affect anything.  It just started happening, and it is only when i take a deep breath.       Facial pain or pressure: The facial pain or pressure does not feel worse when bending or leaning forward.     Headache: She states the headache is severe (7-9 on a 10 point pain scale).      Sidra denies having ear pain, sore throat, teeth pain, ageusia, and vomiting. She also denies having a sinus infection within the past year and having recent facial or sinus surgery in the past 60 days.   Precipitating events  She has not recently been exposed to someone with influenza. Sidra has been " in close contact with the following high risk individuals: adults 65 or older.   Pertinent COVID-19 (Coronavirus) information  In the past 14 days, Sidra has not worked in a congregate living setting.   She either works or volunteers as a healthcare worker or a , or works or volunteers in a healthcare facility. She does not provide direct patient care. Additional job details as reported by the patient (free text): [employer], currently working from home and have been since March.   Sidra also has not lived in a congregate living setting in the past 14 days. She does not live with a healthcare worker.   Sidra has not had a close contact with a laboratory-confirmed COVID-19 patient within 14 days of symptom onset.   Since December 2019, Sidra and has not had upper respiratory infection or influenza-like illness. Has not been diagnosed with lab-confirmed COVID-19 test   Pertinent medical history  Sidra has taken an antibiotic medication in the past month. Antibiotic details as reported by the patient (free text): nitrofurantoin mono/mac.  For a UTI, which in itself is weird because cassandra had 1 my whole entire life.   Sidra does not get yeast infections when she takes antibiotics.   Sidra does not need a return to work/school note.   Weight: 115 lbs   Sidra smokes or uses smokeless tobacco.   Additional information as reported by the patient (free text): I visited this site yesterday.  I have a covid test tomorrow.  My symptoms have increased and changed over night.  I am experiencing a moderate amount of chest tightness and now constant burning in my chest.  I coughed up the thickest, green, chunks (nothing phlemmy about it) this morning and my chest burned so bad i started sweating.  The mucus out of my nose was blood tinged and green.  My headache has been constant and pain has increased.  I need an antibiotic.  My chest is killing me.   Weight: 115 lbs    MEDICATIONS: nitrofurantoin  oral, ALLERGIES: NKDA  Clinician Response:  Dear Sidra,  I am sorry you are not feeling well. To determine the most appropriate care for you, I would like you to be seen in person to further discuss your health history and symptoms.  You will not be charged for this Visit. Thank you for trusting us with your care.  COVID-19 (Coronavirus) General Information  Because there is currently no vaccine to prevent infection, the best way to protect yourself is to avoid being exposed to this virus. Common symptoms of COVID-19 include but are not limited to fever, cough, and shortness of breath. These symptoms appear 2-14 days after you are exposed to the virus that causes COVID-19. Click here for more information from the CDC on how to protect yourself.  If you are sick with COVID-19 or suspect you are infected with the virus that causes COVID-19, follow the steps here from the CDC to help prevent the disease from spreading to people in your home and community.  Click here for general information from the CDC on testing.  If you develop any of these emergency warning signs for COVID-19, get medical attention immediately:     Trouble breathing    Persistent pain or pressure in the chest    New confusion or inability to arouse    Bluish lips or face      Call your doctor or clinic before going in. Call 911 if you have a medical emergency and notify the  you have or think you may have COVID-19.  For more detailed and up to date information on COVID-19 (Coronavirus), please visit the CDC website.   Diagnosis: Refer for additional evaluation  Diagnosis ICD: R69

## 2020-09-01 NOTE — PROGRESS NOTES
SUBJECTIVE:   Sidra Macedo is a 50 year old female presenting with a chief complaint of fever, cough and cold sx.  Fever up to 101.2  Also chest feels little tight and congested but no real SOB or chest pain.  Does feel fatigued and body aches.  No ST, ear pain, rashes or GI sx noted.  No underlying asthma. No exposure to COVID that aware of  Onset of symptoms was 1 day(s) ago.  Course of illness is worsening.    Severity moderate  Current and Associated symptoms: negative other than stated above  Treatment measures tried include Tylenol/Ibuprofen, Fluids and Rest.  Predisposing factors include None.    No underlying asthma., heart issues.     Past Medical History:   Diagnosis Date     Headache(784.0)     intermittent migraines, treats with tylenol     Molar pregnancy      Current Outpatient Medications   Medication Sig Dispense Refill     Cyanocobalamin (VITAMIN B 12 PO)        nitroFURantoin macrocrystal-monohydrate (MACROBID) 100 MG capsule Take 100 mg by mouth 2 times daily       VITAMIN D, CHOLECALCIFEROL, PO Take by mouth daily       VITAMIN E PO        Social History     Tobacco Use     Smoking status: Light Tobacco Smoker     Packs/day: 1.00     Years: 30.00     Pack years: 30.00     Types: Cigarettes     Last attempt to quit: 8/1/2005     Years since quitting: 15.0     Smokeless tobacco: Never Used   Substance Use Topics     Alcohol use: No     Alcohol/week: 0.0 standard drinks       ROS:  Review of systems negative except as stated above.    OBJECTIVE:  /66   Pulse 100   Temp 98.4  F (36.9  C)   Wt 52.2 kg (115 lb)   SpO2 97%   BMI 21.73 kg/m    GENERAL APPEARANCE: healthy, alert and no distress  EYES: EOMI,  PERRL, conjunctiva clear  HENT: ear canals and TM's normal.  Nose and mouth without ulcers, erythema or lesions  NECK: supple, nontender, no lymphadenopathy  RESP: lungs clear to auscultation - no rales, rhonchi or wheezes  CV: regular rates and rhythm, normal S1 S2, no murmur  noted  NEURO: Normal strength and tone, sensory exam grossly normal,  normal speech and mentation  SKIN: no suspicious lesions or rashes    Chest x-ray  No clear infiltrate noted per my read  Pending radiology review    COVID  Test results pending       ASSESSMENT:  Cough and fever. Possible COVID    PLAN:  Exam , vitals and chest x-ray reassuring at this time.  COVID pending.  Will quarantine until results.  OTC med for sx relief and albuterol inhaler as needed.  No signs of infection and hold antibiotics for now.  Red flag signs discussed and to Follow-up with PCP or ER if SOB, chest pain develop.  Fluids encouraged.

## 2020-09-02 LAB
SARS-COV-2 RNA SPEC QL NAA+PROBE: NOT DETECTED
SPECIMEN SOURCE: NORMAL

## 2020-09-13 ENCOUNTER — OFFICE VISIT (OUTPATIENT)
Dept: URGENT CARE | Facility: URGENT CARE | Age: 50
End: 2020-09-13
Payer: COMMERCIAL

## 2020-09-13 ENCOUNTER — HOSPITAL ENCOUNTER (EMERGENCY)
Facility: CLINIC | Age: 50
Discharge: HOME | End: 2020-09-13
Attending: EMERGENCY MEDICINE | Admitting: EMERGENCY MEDICINE
Payer: COMMERCIAL

## 2020-09-13 ENCOUNTER — APPOINTMENT (OUTPATIENT)
Dept: CT IMAGING | Facility: CLINIC | Age: 50
End: 2020-09-13
Attending: EMERGENCY MEDICINE
Payer: COMMERCIAL

## 2020-09-13 ENCOUNTER — APPOINTMENT (OUTPATIENT)
Dept: MRI IMAGING | Facility: CLINIC | Age: 50
End: 2020-09-13
Attending: EMERGENCY MEDICINE
Payer: COMMERCIAL

## 2020-09-13 VITALS
TEMPERATURE: 97.9 F | RESPIRATION RATE: 12 BRPM | HEART RATE: 79 BPM | OXYGEN SATURATION: 98 % | SYSTOLIC BLOOD PRESSURE: 118 MMHG | DIASTOLIC BLOOD PRESSURE: 72 MMHG

## 2020-09-13 VITALS
OXYGEN SATURATION: 100 % | HEART RATE: 84 BPM | DIASTOLIC BLOOD PRESSURE: 80 MMHG | TEMPERATURE: 97.6 F | SYSTOLIC BLOOD PRESSURE: 122 MMHG

## 2020-09-13 DIAGNOSIS — S10.93XA CONTUSION OF FACE, SCALP AND NECK, INITIAL ENCOUNTER: ICD-10-CM

## 2020-09-13 DIAGNOSIS — R55 SYNCOPE, UNSPECIFIED SYNCOPE TYPE: ICD-10-CM

## 2020-09-13 DIAGNOSIS — S09.93XA FACIAL INJURY, INITIAL ENCOUNTER: ICD-10-CM

## 2020-09-13 DIAGNOSIS — S00.81XA ABRASION OF FACE, INITIAL ENCOUNTER: ICD-10-CM

## 2020-09-13 DIAGNOSIS — S06.0X9A CONCUSSION WITH LOSS OF CONSCIOUSNESS, INITIAL ENCOUNTER: Primary | ICD-10-CM

## 2020-09-13 DIAGNOSIS — S00.03XA CONTUSION OF FACE, SCALP AND NECK, INITIAL ENCOUNTER: ICD-10-CM

## 2020-09-13 DIAGNOSIS — S00.83XA CONTUSION OF FACE, SCALP AND NECK, INITIAL ENCOUNTER: ICD-10-CM

## 2020-09-13 DIAGNOSIS — S06.0X9A CONCUSSION WITH LOSS OF CONSCIOUSNESS, INITIAL ENCOUNTER: ICD-10-CM

## 2020-09-13 DIAGNOSIS — S09.90XA TRAUMATIC INJURY OF HEAD, INITIAL ENCOUNTER: ICD-10-CM

## 2020-09-13 LAB
ANION GAP SERPL CALCULATED.3IONS-SCNC: 5 MMOL/L (ref 3–14)
APTT PPP: 32 SEC (ref 22–37)
BASOPHILS # BLD AUTO: 0.1 10E9/L (ref 0–0.2)
BASOPHILS NFR BLD AUTO: 0.5 %
BUN SERPL-MCNC: 16 MG/DL (ref 7–30)
CALCIUM SERPL-MCNC: 9.3 MG/DL (ref 8.5–10.1)
CHLORIDE SERPL-SCNC: 108 MMOL/L (ref 94–109)
CO2 SERPL-SCNC: 27 MMOL/L (ref 20–32)
CREAT SERPL-MCNC: 0.75 MG/DL (ref 0.52–1.04)
DIFFERENTIAL METHOD BLD: ABNORMAL
EOSINOPHIL # BLD AUTO: 0.3 10E9/L (ref 0–0.7)
EOSINOPHIL NFR BLD AUTO: 1.7 %
ERYTHROCYTE [DISTWIDTH] IN BLOOD BY AUTOMATED COUNT: 12.2 % (ref 10–15)
ETHANOL SERPL-MCNC: <0.01 G/DL
GFR SERPL CREATININE-BSD FRML MDRD: >90 ML/MIN/{1.73_M2}
GLUCOSE BLDC GLUCOMTR-MCNC: 86 MG/DL (ref 70–99)
GLUCOSE SERPL-MCNC: 101 MG/DL (ref 70–99)
HCT VFR BLD AUTO: 40.2 % (ref 35–47)
HGB BLD-MCNC: 14.1 G/DL (ref 11.7–15.7)
IMM GRANULOCYTES # BLD: 0.1 10E9/L (ref 0–0.4)
IMM GRANULOCYTES NFR BLD: 0.6 %
INR PPP: 0.98 (ref 0.86–1.14)
LYMPHOCYTES # BLD AUTO: 2.6 10E9/L (ref 0.8–5.3)
LYMPHOCYTES NFR BLD AUTO: 15.8 %
MCH RBC QN AUTO: 30.7 PG (ref 26.5–33)
MCHC RBC AUTO-ENTMCNC: 35.1 G/DL (ref 31.5–36.5)
MCV RBC AUTO: 87 FL (ref 78–100)
MONOCYTES # BLD AUTO: 0.6 10E9/L (ref 0–1.3)
MONOCYTES NFR BLD AUTO: 3.5 %
NEUTROPHILS # BLD AUTO: 12.7 10E9/L (ref 1.6–8.3)
NEUTROPHILS NFR BLD AUTO: 77.9 %
NRBC # BLD AUTO: 0 10*3/UL
NRBC BLD AUTO-RTO: 0 /100
PLATELET # BLD AUTO: 312 10E9/L (ref 150–450)
POTASSIUM SERPL-SCNC: 3.4 MMOL/L (ref 3.4–5.3)
RBC # BLD AUTO: 4.6 10E12/L (ref 3.8–5.2)
SODIUM SERPL-SCNC: 140 MMOL/L (ref 133–144)
TROPONIN I SERPL-MCNC: <0.015 UG/L (ref 0–0.04)
WBC # BLD AUTO: 16.3 10E9/L (ref 4–11)

## 2020-09-13 PROCEDURE — 84484 ASSAY OF TROPONIN QUANT: CPT | Performed by: EMERGENCY MEDICINE

## 2020-09-13 PROCEDURE — 00000146 ZZHCL STATISTIC GLUCOSE BY METER IP

## 2020-09-13 PROCEDURE — 70496 CT ANGIOGRAPHY HEAD: CPT

## 2020-09-13 PROCEDURE — 25000128 H RX IP 250 OP 636: Performed by: EMERGENCY MEDICINE

## 2020-09-13 PROCEDURE — 25000132 ZZH RX MED GY IP 250 OP 250 PS 637: Performed by: EMERGENCY MEDICINE

## 2020-09-13 PROCEDURE — 0042T CT HEAD PERFUSION WITH CONTRAST: CPT

## 2020-09-13 PROCEDURE — 72125 CT NECK SPINE W/O DYE: CPT

## 2020-09-13 PROCEDURE — 80320 DRUG SCREEN QUANTALCOHOLS: CPT | Performed by: EMERGENCY MEDICINE

## 2020-09-13 PROCEDURE — 80048 BASIC METABOLIC PNL TOTAL CA: CPT | Performed by: EMERGENCY MEDICINE

## 2020-09-13 PROCEDURE — 70551 MRI BRAIN STEM W/O DYE: CPT

## 2020-09-13 PROCEDURE — 85730 THROMBOPLASTIN TIME PARTIAL: CPT | Performed by: EMERGENCY MEDICINE

## 2020-09-13 PROCEDURE — 85025 COMPLETE CBC W/AUTO DIFF WBC: CPT | Performed by: EMERGENCY MEDICINE

## 2020-09-13 PROCEDURE — 99291 CRITICAL CARE FIRST HOUR: CPT | Mod: 25

## 2020-09-13 PROCEDURE — 96365 THER/PROPH/DIAG IV INF INIT: CPT | Mod: 59

## 2020-09-13 PROCEDURE — 99214 OFFICE O/P EST MOD 30 MIN: CPT | Performed by: PHYSICIAN ASSISTANT

## 2020-09-13 PROCEDURE — 25000125 ZZHC RX 250: Performed by: EMERGENCY MEDICINE

## 2020-09-13 PROCEDURE — 70486 CT MAXILLOFACIAL W/O DYE: CPT

## 2020-09-13 PROCEDURE — 70450 CT HEAD/BRAIN W/O DYE: CPT

## 2020-09-13 PROCEDURE — 96375 TX/PRO/DX INJ NEW DRUG ADDON: CPT | Mod: 59

## 2020-09-13 PROCEDURE — 85610 PROTHROMBIN TIME: CPT | Performed by: EMERGENCY MEDICINE

## 2020-09-13 PROCEDURE — 93005 ELECTROCARDIOGRAM TRACING: CPT

## 2020-09-13 RX ORDER — OXYCODONE HYDROCHLORIDE 5 MG/1
5 TABLET ORAL EVERY 6 HOURS PRN
Qty: 12 TABLET | Refills: 0 | Status: SHIPPED | OUTPATIENT
Start: 2020-09-13 | End: 2021-01-08

## 2020-09-13 RX ORDER — IOPAMIDOL 755 MG/ML
500 INJECTION, SOLUTION INTRAVASCULAR ONCE
Status: COMPLETED | OUTPATIENT
Start: 2020-09-13 | End: 2020-09-13

## 2020-09-13 RX ORDER — OXYCODONE HYDROCHLORIDE 5 MG/1
10 TABLET ORAL ONCE
Status: COMPLETED | OUTPATIENT
Start: 2020-09-13 | End: 2020-09-13

## 2020-09-13 RX ORDER — AMOXICILLIN 500 MG/1
CAPSULE ORAL
COMMUNITY
Start: 2020-09-03 | End: 2022-05-17

## 2020-09-13 RX ORDER — HYDROMORPHONE HYDROCHLORIDE 1 MG/ML
0.5 INJECTION, SOLUTION INTRAMUSCULAR; INTRAVENOUS; SUBCUTANEOUS ONCE
Status: COMPLETED | OUTPATIENT
Start: 2020-09-13 | End: 2020-09-13

## 2020-09-13 RX ORDER — CEFTRIAXONE 2 G/1
2 INJECTION, POWDER, FOR SOLUTION INTRAMUSCULAR; INTRAVENOUS ONCE
Status: COMPLETED | OUTPATIENT
Start: 2020-09-13 | End: 2020-09-13

## 2020-09-13 RX ORDER — CEPHALEXIN 500 MG/1
500 CAPSULE ORAL 4 TIMES DAILY
Qty: 28 CAPSULE | Refills: 0 | Status: SHIPPED | OUTPATIENT
Start: 2020-09-13 | End: 2020-09-20

## 2020-09-13 RX ORDER — MUPIROCIN 20 MG/G
OINTMENT TOPICAL 3 TIMES DAILY
Qty: 30 G | Refills: 0 | Status: SHIPPED | OUTPATIENT
Start: 2020-09-13 | End: 2020-09-20

## 2020-09-13 RX ORDER — FLUTICASONE PROPIONATE 50 MCG
SPRAY, SUSPENSION (ML) NASAL
COMMUNITY
Start: 2020-09-03

## 2020-09-13 RX ORDER — ACETAMINOPHEN 325 MG/1
650 TABLET ORAL EVERY 6 HOURS PRN
Qty: 1 BOTTLE | Refills: 0 | Status: SHIPPED | OUTPATIENT
Start: 2020-09-13 | End: 2021-01-08

## 2020-09-13 RX ORDER — IBUPROFEN 200 MG
400 TABLET ORAL EVERY 6 HOURS PRN
Qty: 60 TABLET | Refills: 0 | Status: SHIPPED | OUTPATIENT
Start: 2020-09-13 | End: 2021-01-08

## 2020-09-13 RX ADMIN — SODIUM CHLORIDE 100 ML: 9 INJECTION, SOLUTION INTRAVENOUS at 18:35

## 2020-09-13 RX ADMIN — IOPAMIDOL 120 ML: 755 INJECTION, SOLUTION INTRAVENOUS at 18:35

## 2020-09-13 RX ADMIN — CEFTRIAXONE SODIUM 2 G: 2 INJECTION, POWDER, FOR SOLUTION INTRAMUSCULAR; INTRAVENOUS at 19:05

## 2020-09-13 RX ADMIN — OXYCODONE HYDROCHLORIDE 10 MG: 5 TABLET ORAL at 21:44

## 2020-09-13 RX ADMIN — HYDROMORPHONE HYDROCHLORIDE 0.5 MG: 1 INJECTION, SOLUTION INTRAMUSCULAR; INTRAVENOUS; SUBCUTANEOUS at 19:52

## 2020-09-13 NOTE — ED PROVIDER NOTES
"  History     Chief Complaint:  Fall    HPI   Sidra Macedo is a 50 year old female who presents with her son for evaluation of facial trauma, headache and neck pain.  [place] and 2 days ago she went out to the car and it was raining and she was wearing her boyfriend's shoes.  The next thing she recalls, she was on the ground with blood on her face and loose/mildly displaced left upper central incisor, which she \"pushed back into place\".  Since then she has been feeling out of it and having a headache and right-sided neck pain and facial pain.  She denies blood thinner use.  She states she is currently taking amoxicillin for a sinus infection.  She denies any drug or alcohol use.  She was at urgent care prior to arrival for assessment of these injuries and was sent to the ER.  Her son with her, states that she seems a little bit confused.  Patient states her vision was a bit blurry yesterday, but her vision is fine today.  She denies numbness or weakness in extremities or falling to one side with ambulation.    Allergies:  No Known Drug Allergies     Medications:    Amoxicillin    Past Medical History:    Headache     Past Surgical History:    ACL repair right knee    Family History:    Mother - asthma, breast cancer  Father - heart disease, alcohol/drug, bypass  Brother(s) - neurologic disorder, seizures, asthma    Social History:  The patient was accompanied to the ED by son.  Smoking Status: Yes - light tobacco smoker  Smokeless Tobacco: Never  Alcohol Use: No  Drug Use: No   Marital Status:   [4]     Review of Systems  A 10 point ROS was obtained and negative except as noted here and in HPI    Physical Exam     Patient Vitals for the past 24 hrs:   BP Temp Temp src Pulse Resp SpO2   09/13/20 2146 -- -- -- -- -- 98 %   09/13/20 2045 -- -- -- -- -- 99 %   09/13/20 2030 118/72 -- -- -- -- --   09/13/20 1848 -- 97.9  F (36.6  C) Temporal -- -- --   09/13/20 1845 " 125/67 -- -- 79 12 --   09/13/20 1809 (!) 148/84 -- -- 78 18 99 %       Physical Exam  HENT: scalp atraumatic. No midface instability. oropharynx clear but left central upper incisor was tender to percussion but not obviously loose abrasions to the left cheek with associated swelling and scab, abrasion to the philtrum with overlying scab, abrasion to the nose.  EYES: extraocular movements intact, pupils are equal and reactive to light bilaterally  NECK: C-collar in place. no midline C spine ttp nor stepoffs  CV: Rate as noted, regular rhythm.  RESP: Effort normal. Symmetric chest rise, Breath sounds are present bilaterally  CHEST: No chest wall tenderness with AP and lateral compression  GI: Abdomen not tender, not distended  PELVIS: stable  NEURO:   Motor 6=Obeys commands   Verbal 5=Oriented   Eye Opening 4=Spontaneous   GCS Total: 15     cranial nerves II through XII are intact aside from left lower face which is weak, 5 out of 5 strength in all 4 extremities, sensation is intact light touch in all 4 extremities  EXTREMITIES: No deformity of the extremities  BACK: No midline T/L spine ttp nor step-offs  SKIN: Abrasions to face per above    Emergency Department Course     ECG:  ECG taken at 1813, ECG read at 1815 by Med Ramos MD  Normal sinus rhythm  Low voltage QRS  Cannot rule out inferior infarct, age undetermined  Cannot rule out anterior infarct, age undetermined  Abnormal ECG  Rate 84 bpm. TX interval 140. QRS duration 80. QT/QTc 366/432. P-R-T axes 71 3 34.      Imaging:  Radiology findings were communicated with the patient and family who voiced understanding of the findings.    MR Brain w/o Contrast   Final Result   IMPRESSION:   1.  No acute intracranial process.   2.  Pneumatized left petrous apex with inspissated/proteinaceous secretions and/or developing cluster granuloma.   3.  Left facial fat stranding consistent with contusion.      Cervical spine CT w/o contrast   Final Result   IMPRESSION:    1.  No fracture or posttraumatic subluxation.   2.  No high-grade spinal canal or neural foraminal stenosis.      CT Facial Bones without Contrast   Final Result   IMPRESSION:    1.  Left facial contusion without associated fracture. No radiopaque foreign body.         CT Head Perfusion w Contrast   Final Result   IMPRESSION:       CT PERFUSION:   1.  Normal cerebral perfusion.      CTA Head Neck with Contrast   Final Result   IMPRESSION:       HEAD CTA:    1.  Normal CTA [place] [facility] [place].      NECK CTA:   1.  Normal neck CTA.      Head CT w/o contrast   Final Result   IMPRESSION:   1.  No acute intracranial process.      Results communicated to Dr. Ramos at 9/13/2020 6:38 PM.      Zio Patch Holter Adult Pediatric Greater than 48 hrs    (Results Pending)        Laboratory:  Laboratory findings were communicated with the patient and family who voiced understanding of the findings.    Labs Ordered and Resulted from Time of ED Arrival Up to the Time of Departure from the ED   BASIC METABOLIC PANEL - Abnormal; Notable for the following components:       Result Value    Glucose 101 (*)     All other components within normal limits   CBC WITH PLATELETS DIFFERENTIAL - Abnormal; Notable for the following components:    WBC 16.3 (*)     Absolute Neutrophil 12.7 (*)     All other components within normal limits   INR   PARTIAL THROMBOPLASTIN TIME   TROPONIN I   GLUCOSE BY METER   ALCOHOL ETHYL       Interventions:  Medications   Saline CT scan flush (100 mLs Intravenous Given 9/13/20 1835)   iopamidol (ISOVUE-370) solution 500 mL (120 mLs Intravenous Given 9/13/20 1835)   cefTRIAXone (ROCEPHIN) 2 g vial to attach to  ml bag for ADULTS or NS 50 ml bag for PEDS (0 g Intravenous Stopped 9/13/20 2000)   HYDROmorphone (PF) (DILAUDID) injection 0.5 mg (0.5 mg Intravenous Given 9/13/20 1952)   oxyCODONE (ROXICODONE) tablet 10 mg (10 mg Oral Given 9/13/20 2144)       Emergency Department Course:  Past medical records,  nursing notes, and vitals reviewed.    1759: I performed an exam of the patient as documented above.     1806: PARTIAL TRAUMA.    1806: CODE STROKE.    1809: I spoke with Dr. Eun Razo of the Stroke Neurology service regarding patient's presentation, findings, and plan of care.      IV was inserted and blood was drawn for laboratory testing, results above.    The patient was sent for imaging studies while in the emergency department, results above.      EKG obtained in the ED, see results above.      1839: I spoke with Radiology service regarding patient's presentation, findings, and plan of care.     1844: I spoke with Dr. Eun Razo of the Stroke Neurology service regarding patient's presentation, findings, and plan of care. De-escalate stroke.     1857: Patient re-evaluated and results thus far discussed. Patient will be sent for MRI.     2111: I spoke with Dr. Alston of the ENT service regarding patient's presentation, findings, and plan of care.      2140: Rechecked patient and discussed plan of care. Patient will be discharged.     Findings and plan explained to the Patient and son. Patient discharged home with instructions regarding supportive care, medications, and reasons to return. The importance of close follow-up was reviewed. The patient was prescribed acetaminophen, ibuprofen, Keflex, Bactroban and oxycodone.    I personally reviewed the laboratory and imaging results with the Patient and son and answered all related questions prior to discharge.     Impression & Plan   Trauma:  Level of trauma activation: Partial  C-collar and immobilization: applied in ED on initial evaluation.  CSpine Clearance: cleared after negative CT and with clinical evaluation  GCS at arrival: 15  GCS at disposition: unchanged  Full Primary and Secondary survey with appropriate immobilization of spine completed in exam section.  Consults prior to admission or transfer: Stroke neuro and ENT  Procedures done in the ED:  none    Medical Decision Making:  Patient presents to the ER for evaluation of recent syncopal event, facial trauma, headache.    On arrival, vital signs within normal range.  On exam she has facial abrasions, contusion on the left, left lower facial weakness and tenderness to percussion of the left maxillary central incisor.  She has no midline vertebral tenderness but seems a bit confused about fine details during history taking.  She is not objectively altered however.  She does not recall the events preceding her syncopal event versus fall 2 days ago.  No other signs of trauma on thorough exam.    Given left lower facial weakness in the setting of trauma, both partial trauma activation and stroke code were called to expedite neuroimaging.    Initial CT imaging of the head and CTA of the head and neck and facial bones and cervical spine does not reveal evidence of intracranial hemorrhage or skull fracture or facial fracture or vertebral fracture or large vessel occlusion or dissection.    Stroke code was de-escalated per stroke neurology.  They recommended MRI of the brain which was obtained and did not reveal evidence of stroke.  There was incidental cluster granuloma versus proteinaceous secretions in the left petrous apex. I spoke with ENT who felt like this was likely incidental that patient could continue her PTA amoxicillin regimen for sinusitis.  They also agreed that left lower facial weakness was likely related to trauma to the face.    The facial abrasions were cleansed and I did not see any underlying large lacerations.  She was prescribed Keflex given concern for increased risk for secondary infection of these abrasions as well as Bactroban ointment.    With respect to fall versus syncope, EKG is sinus rhythm, no significant electrolyte derangement or evidence of myocardial ischemia.  There was no dysrhythmia on telemetry while in the ER.  She has nonspecific leukocytosis but no fever or obvious signs  of severe infection at this time.  She will be discharged with outpatient ZIO patch order.    Father was at bedside who plans to stay with the patient tonight to monitor for concussion symptoms.  I encouraged close primary care follow-up, neurology follow-up to monitor concussion symptoms, dentistry follow-up due to reports of initially having a loose left central maxillary incisor.  Discussed soft diet.  Return precautions were discussed prior to discharge.    Diagnosis:    ICD-10-CM    1. Concussion with loss of consciousness, initial encounter  S06.0X9A    2. Contusion of face, scalp and neck, initial encounter  S00.83XA     S00.03XA     S10.93XA    3. Syncope, unspecified syncope type  R55 Zio Patch Holter Adult Pediatric Greater than 48 hrs   4. Abrasion of face, initial encounter  S00.81XA        Disposition:  Discharged to home.    Discharge Medications:  New Prescriptions    ACETAMINOPHEN (TYLENOL) 325 MG TABLET    Take 2 tablets (650 mg) by mouth every 6 hours as needed for mild pain or pain    CEPHALEXIN (KEFLEX) 500 MG CAPSULE    Take 1 capsule (500 mg) by mouth 4 times daily for 7 days    IBUPROFEN (ADVIL/MOTRIN) 200 MG TABLET    Take 2 tablets (400 mg) by mouth every 6 hours as needed for pain    MUPIROCIN (BACTROBAN) 2 % EXTERNAL OINTMENT    Apply topically 3 times daily for 7 days    OXYCODONE (ROXICODONE) 5 MG TABLET    Take 1 tablet (5 mg) by mouth every 6 hours as needed for severe pain       Scribe Disclosure:  I, Fabiola Buchanan, am serving as a scribe at 5:59 PM on 9/13/2020 to document services personally performed by Med Ramos MD based on my observations and the provider's statements to me.  9/13/2020   [facility] EMERGENCY DEPARTMENT       Med Ramos MD  09/13/20 9149

## 2020-09-13 NOTE — PROGRESS NOTES
"      SUBJECTIVE:  Sidra Macedo is  A 50 y.o. female who presents to  today for evaluation of an unwitnessed fall (face to concrete), with LOC, 2 days ago.    HPI: Patient states she was \"up north\" in [facility] Friday night (9/11). She was unloading her luggage upon arrival to her destination. Patient has no memory of what happened but states, \"I woke up flat on my face with severe pain on the left side of my face.\" She is unable to estimate the length of  LOC.  She reports the surface of her fall was concrete.    She reports severe (10/10) left sided facial pain. She also reports right sided occipital HA (6/10). Yesterday, patient states, \"I was so tired all I could do was sit in a chair all day.\" She has had associated nausea since injury but no vomiting. She has amnesia surrounding time of injury but denies any further confusion or amnesia.  She had transient blurry vision yesterday. No blurry vision today.     She has noted \"fluid\" coming out of left nostril today (unable to state color). She denies any fluid out of right  Nostril. Denies any fluid out of ears. Patient also states one or more teeth came loose and \"I pushed them back up there\"     Past Medical History:   Diagnosis Date     Headache(784.0)     intermittent migraines, treats with tylenol     Molar pregnancy        Current Outpatient Medications   Medication     Cyanocobalamin (VITAMIN B 12 PO)     VITAMIN D, CHOLECALCIFEROL, PO     VITAMIN E PO     No current facility-administered medications for this visit.        No Known Allergies        OBJECTIVE:  /80   Pulse 84   Temp 97.6  F (36.4  C) (Tympanic)   SpO2 100%       General appearance: alert and no apparent distress  HEAD/FACE:Left sided facial swelling. Significant infra-orbital swelling.  left sided facial abrasions, with evidence of secondary infection (purulent yellow drainage). I intentionally did not palpate orbits today here in  (due to concern for orbital fracture " did not want to potentially move fractured bones and cause intrapment).   HEENT:   Head:   Left TM is normal: no effusions, no erythema, and normal landmarks. Specifically NO hemotympanum.   Right TM is normal: no effusions, no erythema, and normal landmarks. Specifically NO hemotympanum.   Nasal mucosa is not  Examined due  To  Swelling intentionally today   Oropharyngeal exam: No done here today intentionally. Patient has severe left sided facial pain and increased pain with opening mouth.   NECK: Trachea is midline. Patient reports some right sided cervical pain with side to side head turning. No mid-line cervical spine tenderness.   CARDIAC:NORMAL - regular rate and rhythm without murmur.  RESP: Normal - CTA without rales, rhonchi, or wheezing.  NEURO:  PERRLA.  EOM's intact. Alert and oriented.  Undilated fundoscopic exam within normal limits. CN II/XII grossly intact.  Gait within normal limits.  UE/LE strength, DTR's and sensation to light touch good and equal bilaterally.      ASSESSMENT/PLAN:    (S06.0X9A) Concussion with loss of consciousness, initial encounter    MDM: Unwitnessed facial/head injury (fall forward to concrete) in 50 y.o. female with concussion sxs. Concern for fractured zygoma. Concern for orbital floor fracture. Concern that patient reports fluid leaking from left nostril only (raises question of CSF fluid). She also has evidence of secondary bacterial infection around facial abrasions.     Plan: Patient is advised she needs to be seen now  In ER for advanced neuroimaging of head and face. Patient advised she may not drive herself. Son picked her up from  to drive directly to [facility]. I phoned ahead to give report to [facility] ER RN (Tea) and they are expecting arrival via private car shortly.      (S09.90XA) Traumatic injury of head, initial encounter      (S09.93XA) Facial injury, initial encounter  (primary encounter diagnosis)

## 2020-09-13 NOTE — ED TRIAGE NOTES
Unwitnesed fall 2 days ago. Patient was seen at urgent care and sent here for concerns of orbital fracture and basilar skull fracture.

## 2020-09-13 NOTE — CONSULTS
"[facility]    Stroke Telephone Note    I was called by Dr. Med Ramos on 09/13/20 at 1809 regarding patient Sidra Macedo. The patient is a 50 year old female with history of tobacco abuse who presents as a stroke code for L facial droop and confusion since a fall related to loss of consciousness with facial trauma. She reportedly was outside in the rain on 9/11 and does not recall falling, but woke up on the ground with blood around her and these described symptoms.    Stroke Code Data  (for stroke code without tele)  Stroke code activated 09/13/20   1809   First stroke provider response         Last known normal 09/11/20   1800   Time of discovery   (or onset of symptoms) 09/13/20   1200   Head CT read by me 09/13/20   1830   Was stroke code de-escalated? Yes 09/13/20 1847  presence of contraindications for both intravenous and intra-arterial stroke treatments       TPA Treatment   Not given due to unclear or unfavorable risk-benefit profile for extended window thrombolysis beyond the conventional 4.5 hour time window.    Endovascular Treatment  Not initiated due to absence of proximal vessel occlusion    Impression  L facial droop and confusion- possibly related to cerebral infarction, but this is not clear at this time. Peripheral nerve injury or TBI are also possible.    Recommendations  MRI brain    My recommendations are based on the limited information provided on the phone by Dr. Med Ramos. They are not intended to replace the clinical judgment of Dr. Med Ramos which should always be utilized to provide the most appropriate care to meet the unique needs of this patient.  I was not requested to personally see or examine the patient at this time.    The Stroke Staff is Dr. Martin.    Eun Razo MD  Vascular Neurology Fellow  To page me or covering stroke neurology team member, click here: [facility]COM   Choose \"On Call\" tab at top, then search dropdown box for \"Neurology Adult\", " select location, press Enter, then look for stroke/neuro ICU/telestroke.

## 2020-09-13 NOTE — ED AVS SNAPSHOT
[facility] Emergency Department  [address]  [facility] [address]  Phone:  [phone]  Fax:  [phone]                                    Sidra Macedo   MRN: [id number]    Department:  [facility] Emergency Department   Date of Visit:  9/13/2020           After Visit Summary Signature Page    I have received my discharge instructions, and my questions have been answered. I have discussed any challenges I see with this plan with the nurse or doctor.    ..........................................................................................................................................  Patient/Patient Representative Signature      ..........................................................................................................................................  Patient Representative Print Name and Relationship to Patient    ..................................................               ................................................  Date                                   Time    ..........................................................................................................................................  Reviewed by Signature/Title    ...................................................              ..............................................  Date                                               Time          22EPIC Rev 08/18

## 2020-09-13 NOTE — ED NOTES
[place]. Currently c/o headache, right sided neck pain, and pain to abrasions on face. Pt doesn't recall event. Pt reports a tooth was displaced and she pushed it back in place. Pt has been alternating tylenol and ibuprofen doses at home. Pt reports taking five 200mg tabs of ibuprofen every 4 hours and 500mg of tylenol every 4 hours. Pt reports having a sticky fluid dripping from her nose yesterday. Pt having a hard time following conversation fully but A&Ox4. Pt's son at bedside and reports he feels is mom is acting strange.

## 2020-09-14 LAB — INTERPRETATION ECG - MUSE: NORMAL

## 2020-09-14 NOTE — DISCHARGE INSTRUCTIONS
Discharge Instructions  Concussion    You were seen today for signs of a concussion.  The symptoms will vary, depending on the nature of your injury and your health. You may have: headache, confusion, nausea (feel sick to your stomach), vomiting (throwing up) and problems with memory, concentrating, or sleep. You may feel dizzy, irritable, and tired. Children and teens may need help from their parents, teachers, and coaches to watch for symptoms as they recover.    Generally, every Emergency Department visit should have a follow-up clinic visit with either a primary or a specialty clinic/provider. Please follow-up as instructed by your emergency provider today.     Return to the Emergency Department if:  Your headache gets worse or you start to have a really bad headache even with the recommended treatment plan.   You feel drowsier, have growing confusion, or slurred speech.   You keep repeating yourself.   You have strange behavior or are feeling more irritable.   You have a seizure.   You vomit (throw up) more than once.   You have trouble walking.   You have weakness or numbness.  Your neck pain gets worse.   You have a loss of consciousness.   You have blood for fluid coming from your ears or nose.   You have new symptoms or anything that worries you.     Home Care:  Get lots of rest and get enough sleep at night. Take daytime naps or rest if you feel tired.   Limit physical activity and  thinking  activities. These can make symptoms worse.   Physical activities include gym, sports, weight training, running, exercise, and heavy lifting.   Thinking activities include homework, class work, job-related work, and screen time (phone, computer, tablet, TV, and video games).   Stick to a healthy diet and drink lots of fluids. Avoid alcohol.  As symptoms improve, you may slowly return to your daily activities. If symptoms get worse or return, reduce your activity.   Know that it is normal to feel sad or frustrated when  you do not feel right and are less active.     Going Back to Work:  Your care team will tell you when you are ready to return to work.    Limit the amount of work you do soon after your injury. This may speed healing. Take breaks if your symptoms get worse. You should also reduce your physical activity as well as activities that require a lot of thinking until you see your doctor. You may need shorter work days and a lighter workload.  Avoid heavy lifting, working with machinery, driving and working at heights until your symptoms are gone or you are cleared by a provider.    Going Back to School:  If you are still having symptoms, you may need extra help at school.  Tell your teachers and school nurse about your injury and symptoms. Ask them to watch for problems with learning, memory, and concentrating. Symptoms may get worse when you do schoolwork, and you may become more irritable. You may need shorter school days, a reduced workload, and to postpone testing.  Do not drive or take gym class (physical activity) until cleared by a provider.    Returning to Sports:  Never return to play if you have any symptoms. A full recovery will reduce the chances of getting hurt again. Remember, it is better to miss one or two games than a whole season.  You should rest from all physical activity until you see your provider. Generally, if all symptoms have completely cleared, your provider can help guide you to slowly return to sports. If symptoms return or worsen, stop the activity and see your provider.  Important: If you are in an organized sport and under age 18, you will need written consent from a healthcare provider before you return to sports. Typically, this will be your primary care or sports medicine provider. Please make an appointment.    If you were given a prescription for medicine here today, be sure to read all of the information (including the package insert) that comes with your prescription.  This will  include important information about the medicine, its side effects, and any warnings that you need to know about.  The pharmacist who fills the prescription can provide more information and answer questions you may have about the medicine.  If you have questions or concerns that the pharmacist cannot address, please call or return to the Emergency Department.     Remember that you can always come back to the Emergency Department if you are not able to see your regular provider in the amount of time listed above, if you get any new symptoms, or if there is anything that worries you.    Discharge Instructions  Syncope    Syncope (fainting) is a sudden, short loss of consciousness (passing out spell). People will usually fall to the ground when they faint or slump over if seated.  People may also shake when this happens, and it can sometimes be difficult to tell the difference between syncope and a seizure. At this time, your provider does not find a reason to suspect that your fainting spell is a sign of anything dangerous or life-threatening.  However, sometimes the signs of serious illness do not show up right away.     Generally, every Emergency Department visit should have a follow-up clinic visit with either a primary or a specialty clinic/provider. Please follow-up as instructed by your emergency provider today.    Return to the Emergency Department if:  You faint again.   You have any significant bleeding.  You have chest pain or a fast or irregular heartbeat.  You feel short of breath.  You cough up any blood.  You have abdominal (belly) pain or unusual back pain.  You have ongoing vomiting (throwing up) or diarrhea (loose stools).  You have a black or tarry bowel movement, or blood in the stool or in your vomit.  You have a fever over 101 F.  You lose feeling or cannot move a part of your body or cannot talk normally.  You are confused, have a headache, cannot see well, or have a seizure.  DO NOT DRIVE. CALL  911 INSTEAD!    What can I do to help myself?  Follow any specific instructions that your provider discussed with you.  If you feel light-headed, make sure to sit down right away, even if you have to sit on the floor.  Follow up with your regular medical provider as discussed for further management. This may include lowering your blood pressure medications, insulin or other diabetic medications, checking your blood sugar more frequently, and drinking more fluids, taking medicines for vomiting or diarrhea or getting up slower.  If you were given a prescription for medicine here today, be sure to read all of the information (including the package insert) that comes with your prescription.  This will include important information about the medicine, its side effects, and any warnings that you need to know about.  The pharmacist who fills the prescription can provide more information and answer questions you may have about the medicine.  If you have questions or concerns that the pharmacist cannot address, please call or return to the Emergency Department.   Remember that you can always come back to the Emergency Department if you are not able to see your regular provider in the amount of time listed above, if you get any new symptoms, or if there is anything that worries you.

## 2021-01-08 ENCOUNTER — OFFICE VISIT (OUTPATIENT)
Dept: URGENT CARE | Facility: URGENT CARE | Age: 51
End: 2021-01-08
Payer: COMMERCIAL

## 2021-01-08 VITALS
OXYGEN SATURATION: 98 % | HEART RATE: 74 BPM | TEMPERATURE: 98 F | SYSTOLIC BLOOD PRESSURE: 122 MMHG | DIASTOLIC BLOOD PRESSURE: 68 MMHG | RESPIRATION RATE: 20 BRPM

## 2021-01-08 DIAGNOSIS — H60.391 INFECTIVE OTITIS EXTERNA, RIGHT: Primary | ICD-10-CM

## 2021-01-08 PROCEDURE — 99213 OFFICE O/P EST LOW 20 MIN: CPT | Performed by: PHYSICIAN ASSISTANT

## 2021-01-08 RX ORDER — NEOMYCIN SULFATE, POLYMYXIN B SULFATE, HYDROCORTISONE 3.5; 10000; 1 MG/ML; [USP'U]/ML; MG/ML
3 SOLUTION/ DROPS AURICULAR (OTIC) 4 TIMES DAILY
Qty: 10 ML | Refills: 0 | Status: SHIPPED | OUTPATIENT
Start: 2021-01-08 | End: 2021-01-15

## 2021-01-08 NOTE — PROGRESS NOTES
SUBJECTIVE:  Sidra Macedo is a 51 year old female who presents with right ear pain and fullness for 3 day(s).   Severity: moderate   Timing:sudden onset and still present  Patient denies fever, chills, drainage from ears, tinnitus, pain on the outer ear, recent URI symptoms or recent swimming.       History of recurrent otitis: no    Past Medical History:   Diagnosis Date     Headache(784.0)     intermittent migraines, treats with tylenol     Molar pregnancy      Current Outpatient Medications   Medication Sig Dispense Refill     neomycin-polymyxin-hydrocortisone (CORTISPORIN) 3.5-23128-3 otic solution Place 3 drops into the right ear 4 times daily for 7 days 10 mL 0     amoxicillin (AMOXIL) 500 MG capsule TK 2 CS PO TID UTD       Cyanocobalamin (VITAMIN B 12 PO)        fluticasone (FLONASE) 50 MCG/ACT nasal spray U 1 SPRAY NASALLY BID UTD       VITAMIN D, CHOLECALCIFEROL, PO Take by mouth daily       VITAMIN E PO        Social History     Tobacco Use     Smoking status: Light Tobacco Smoker     Packs/day: 1.00     Years: 30.00     Pack years: 30.00     Types: Cigarettes     Last attempt to quit: 8/1/2005     Years since quitting: 15.4     Smokeless tobacco: Never Used   Substance Use Topics     Alcohol use: No     Alcohol/week: 0.0 standard drinks       ROS:   Review of systems negative except as stated above.    OBJECTIVE:  /68   Pulse 74   Temp 98  F (36.7  C) (Tympanic)   Resp 20   SpO2 98%    EXAM:  The right TM is normal: no effusions, no erythema, and normal landmarks     The right auditory canal is erythematous, swollen and tender  The left TM is normal: no effusions, no erythema, and normal landmarks  The left auditory canal is normal and without drainage, edema or erythema    GENERAL: no acute distress  EYES: EOMI,  PERRL, conjunctiva clear  NECK: supple, non-tender to palpation, no adenopathy noted  RESP: lungs clear to auscultation - no rales, rhonchi or wheezes  CV: regular rates and  rhythm, normal S1 S2, no murmur noted  SKIN: no suspicious lesions or rashes     ASSESSMENT / PLAN:  1. Infective otitis externa, right  OE on exam. No evidence of AOM, perforated TM, mastoiditis, etc  Start drops as prescribed  - neomycin-polymyxin-hydrocortisone (CORTISPORIN) 3.5-23074-0 otic solution; Place 3 drops into the right ear 4 times daily for 7 days  Dispense: 10 mL; Refill: 0    Diagnosis and treatment plan was reviewed with patient and/or family.   We went over any labs or imaging. Discussed worsening symptoms or little to no relief despite treatment plan to follow-up with PCP or return to clinic.  Patient verbalizes understanding. All questions were addressed and answered.   Blanca Ziegler PA-C

## 2021-01-15 ENCOUNTER — HEALTH MAINTENANCE LETTER (OUTPATIENT)
Age: 51
End: 2021-01-15

## 2021-10-24 ENCOUNTER — HEALTH MAINTENANCE LETTER (OUTPATIENT)
Age: 51
End: 2021-10-24

## 2022-02-13 ENCOUNTER — HEALTH MAINTENANCE LETTER (OUTPATIENT)
Age: 52
End: 2022-02-13

## 2022-05-17 ENCOUNTER — OFFICE VISIT (OUTPATIENT)
Dept: URGENT CARE | Facility: URGENT CARE | Age: 52
End: 2022-05-17
Payer: COMMERCIAL

## 2022-05-17 VITALS
SYSTOLIC BLOOD PRESSURE: 122 MMHG | DIASTOLIC BLOOD PRESSURE: 70 MMHG | TEMPERATURE: 98 F | RESPIRATION RATE: 20 BRPM | OXYGEN SATURATION: 98 % | HEART RATE: 78 BPM

## 2022-05-17 DIAGNOSIS — J20.6 ACUTE BRONCHITIS DUE TO RHINOVIRUS: Primary | ICD-10-CM

## 2022-05-17 DIAGNOSIS — F17.200 SMOKER: ICD-10-CM

## 2022-05-17 DIAGNOSIS — R05.9 COUGH: ICD-10-CM

## 2022-05-17 PROCEDURE — U0003 INFECTIOUS AGENT DETECTION BY NUCLEIC ACID (DNA OR RNA); SEVERE ACUTE RESPIRATORY SYNDROME CORONAVIRUS 2 (SARS-COV-2) (CORONAVIRUS DISEASE [COVID-19]), AMPLIFIED PROBE TECHNIQUE, MAKING USE OF HIGH THROUGHPUT TECHNOLOGIES AS DESCRIBED BY CMS-2020-01-R: HCPCS | Performed by: PHYSICIAN ASSISTANT

## 2022-05-17 PROCEDURE — U0005 INFEC AGEN DETEC AMPLI PROBE: HCPCS | Performed by: PHYSICIAN ASSISTANT

## 2022-05-17 PROCEDURE — 99214 OFFICE O/P EST MOD 30 MIN: CPT | Performed by: PHYSICIAN ASSISTANT

## 2022-05-17 RX ORDER — NICOTINE 21 MG/24HR
1 PATCH, TRANSDERMAL 24 HOURS TRANSDERMAL EVERY 24 HOURS
Qty: 30 PATCH | Refills: 0 | Status: SHIPPED | OUTPATIENT
Start: 2022-05-17

## 2022-05-17 RX ORDER — ALBUTEROL SULFATE 90 UG/1
2 AEROSOL, METERED RESPIRATORY (INHALATION) EVERY 6 HOURS
Qty: 18 G | Refills: 0 | Status: SHIPPED | OUTPATIENT
Start: 2022-05-17

## 2022-05-17 ASSESSMENT — ENCOUNTER SYMPTOMS
VOMITING: 0
FEVER: 0
DIARRHEA: 0
COUGH: 1
RHINORRHEA: 0

## 2022-05-17 NOTE — PROGRESS NOTES
Assessment & Plan:        ICD-10-CM    1. Acute bronchitis due to Rhinovirus  J20.6    2. Cough  R05.9 Symptomatic; Unknown COVID-19 Virus (Coronavirus) by PCR Nose     albuterol (PROAIR HFA/PROVENTIL HFA/VENTOLIN HFA) 108 (90 Base) MCG/ACT inhaler     XR Chest 2 Views     nicotine (NICODERM CQ) 21 MG/24HR 24 hr patch     nicotine (NICODERM CQ) 14 MG/24HR 24 hr patch     nicotine (NICODERM CQ) 7 MG/24HR 24 hr patch   3. Smoker  F17.200          Plan/Clinical Decision Making:    Lungs-course on exam, but no rhonchi or rales, slight wheezing.    CXR done and negative for pneumonia.   O2Sat 98%.     Discussed albuterol inhaler. Discussed how to use.   If worsening consider adding steroid.     Smoker- discussed smoking cessation.   Willing to work in quitting.   Interested in Nicoderm. Prescribed 30 days for each step.  Refills done for mg.   Contact PCP for further refills if needed to help with smoking cessation.     At the end of the encounter, I discussed results, diagnosis, medications. Discussed red flags for immediate return to clinic/ER, as well as indications for follow up if no improvement. Patient understood and agreed to plan. Patient was stable for discharge.        Yvette Morrell PA-C on 5/17/2022 at 11:21 AM          Subjective:     HPI:    Sidra is a 52 year old female who presents to clinic today for the following health issues:  Chief Complaint   Patient presents with     Cough     Cough/chest congestion      HPI    Patient complains of chest congestion, harder to take deep breaths, deep cough.   Symptoms since middle of last week.  Over a week ago had fever, headaches. Those symptoms got better, but lingering lung symptoms.   No covid testing or other testing.     No hx of seasonal allergies. No hx of asthma or breathing problems.       History obtained from the patient.    Review of Systems   Constitutional: Negative for fever.   HENT: Negative for congestion and rhinorrhea.    Respiratory:  Positive for cough.    Gastrointestinal: Negative for diarrhea and vomiting.         Patient Active Problem List   Diagnosis     Pregnancy with history of trophoblastic disease     Smoker        Past Medical History:   Diagnosis Date     Headache(784.0)     intermittent migraines, treats with tylenol     Molar pregnancy        Social History     Tobacco Use     Smoking status: Light Tobacco Smoker     Packs/day: 1.00     Years: 30.00     Pack years: 30.00     Types: Cigarettes     Last attempt to quit: 2005     Years since quittin.8     Smokeless tobacco: Never Used   Substance Use Topics     Alcohol use: No     Alcohol/week: 0.0 standard drinks             Objective:     Vitals:    22 1115   BP: 122/70   Pulse: 78   Resp: 20   Temp: 98  F (36.7  C)   TempSrc: Tympanic   SpO2: 98%         Physical Exam   EXAM:   Pleasant, alert, appropriate appearance. NAD.  Head Exam: Normocephalic, atraumatic.  Eye Exam:  non icteric/injection.    Ear Exam: TMs grey without bulging. Normal canals.  Normal pinna.  Nose Exam: Normal external nose.    OroPharynx Exam:  Moist mucous membranes. No erythema, pharynx without exudate or hypertrophy.  Neck/Thyroid Exam:  No LAD.   Chest/Respiratory Exam:  Slight wheezing heard with coughing, no rhonchi, no rales.   Cardiovascular Exam: RRR. No murmur or rubs.        Results:  Results for orders placed or performed in visit on 22   XR Chest 2 Views     Status: None (Preliminary result)    Narrative    CHEST TWO VIEWS  2022 12:00 PM     HISTORY: 52-year-old woman with history of cough.       Impression    IMPRESSION: Since 2020, heart size is normal. No pleural  effusion, pneumothorax, or abnormal area of consolidation.

## 2022-05-18 ENCOUNTER — TELEPHONE (OUTPATIENT)
Dept: NURSING | Facility: CLINIC | Age: 52
End: 2022-05-18
Payer: COMMERCIAL

## 2022-05-18 LAB — SARS-COV-2 RNA RESP QL NAA+PROBE: POSITIVE

## 2022-05-18 NOTE — TELEPHONE ENCOUNTER
Patient classified as COVID treatment eligible by Epic high risk algorithm:  No    Coronavirus (COVID-19) Notification    Reason for call  Notify of POSITIVE COVID-19 lab result, assess symptoms,  review [facility] recommendations    Lab Result   Lab test for 2019-nCoV rRt-PCR or SARS-COV-2 PCR  Oropharyngeal AND/OR nasopharyngeal swabs were POSITIVE for 2019-nCoV RNA [OR] SARS-COV-2 RNA (COVID-19) RNA     We have been unable to reach patient by phone at this time to notify of their Positive COVID-19 result.    Left voicemail message requesting a call back to [phone] [facility] for results.        A Positive COVID-19 letter will be sent via [employer] or the mail. (Exception, no letters sent to Presurgerical/Preprocedure Patients)    Rosie Treviño

## 2022-05-21 ENCOUNTER — NURSE TRIAGE (OUTPATIENT)
Dept: NURSING | Facility: CLINIC | Age: 52
End: 2022-05-21
Payer: COMMERCIAL

## 2022-05-21 NOTE — TELEPHONE ENCOUNTER
Caller reports that she has had Covid with onset of symptoms  8 days ago; ;Seen in  2 days ago awith negative CXR; did not opt for monoclonal antibodies after V V for antivirals   Today notes return of fever to 102  and productive cough   Triage protocol reviewed   Advised to be seen within  24 hrs   Caller will return to    [facility]       Reason for Disposition    [1] Fever returns after gone for over 24 hours AND [2] symptoms worse or not improved    Additional Information    Negative: SEVERE difficulty breathing (e.g., struggling for each breath, speaks in single words)    Negative: Difficult to awaken or acting confused (e.g., disoriented, slurred speech)    Negative: Bluish (or gray) lips or face now    Negative: Shock suspected (e.g., cold/pale/clammy skin, too weak to stand, low BP, rapid pulse)    Negative: Sounds like a life-threatening emergency to the triager    Negative: SEVERE or constant chest pain or pressure  (Exception: Mild central chest pain, present only when coughing.)    Negative: MODERATE difficulty breathing (e.g., speaks in phrases, SOB even at rest, pulse 100-120)    Negative: [1] Headache AND [2] stiff neck (can't touch chin to chest)    Negative: Oxygen level (e.g., pulse oximetry) 90 percent or lower    Negative: Chest pain or pressure    Negative: Patient sounds very sick or weak to the triager    Negative: MILD difficulty breathing (e.g., minimal/no SOB at rest, SOB with walking, pulse <100)    Negative: Fever > 103 F (39.4 C)    Negative: [1] Fever > 101 F (38.3 C) AND [2] age > 60 years    Negative: [1] Fever > 100.0 F (37.8 C) AND [2] bedridden (e.g., nursing home patient, CVA, chronic illness, recovering from surgery)    Negative: HIGH RISK for severe COVID complications (e.g., weak immune system, age > 64 years, obesity with BMI > 25, pregnant, chronic lung disease or other chronic medical condition)  (Exception: Already seen by PCP and no new or worsening  symptoms.)    Negative: [1] HIGH RISK patient AND [2] influenza is widespread in the community AND [3] ONE OR MORE respiratory symptoms: cough, sore throat, runny or stuffy nose    Negative: [1] HIGH RISK patient AND [2] influenza exposure within the last 7 days AND [3] ONE OR MORE respiratory symptoms: cough, sore throat, runny or stuffy nose    Negative: Oxygen level (e.g., pulse oximetry) 91 to 94 percent    Protocols used: CORONAVIRUS (COVID-19) DIAGNOSED OR [id number] 1.18.2022

## 2022-10-16 ENCOUNTER — HEALTH MAINTENANCE LETTER (OUTPATIENT)
Age: 52
End: 2022-10-16

## 2023-03-26 ENCOUNTER — HEALTH MAINTENANCE LETTER (OUTPATIENT)
Age: 53
End: 2023-03-26

## 2024-06-01 ENCOUNTER — HEALTH MAINTENANCE LETTER (OUTPATIENT)
Age: 54
End: 2024-06-01

## 2024-11-12 ENCOUNTER — NURSE TRIAGE (OUTPATIENT)
Dept: FAMILY MEDICINE | Facility: CLINIC | Age: 54
End: 2024-11-12
Payer: COMMERCIAL

## 2024-11-12 NOTE — TELEPHONE ENCOUNTER
Nurse Triage SBAR    Is this a 2nd Level Triage? NO    Situation: Pt reporting fatigue, SOB and chest tightness, that started about 2 weeks ago.    Background: Pt seen yesterday for same symptoms.  All labs and imaging were unremarkable. Pt reports smoking a pack/day of cigarettes for approximately 35-40 years. Patient reporting fatigue over last 6 months. Not able to get through daily activities like she used to.  Family history of heart disease on dads side.     Assessment:   1. LOCATION: Center of chest  2. RADIATION: Denies  3. ONSET: Chest tightness 2 weeks ago   4. PATTERN: Constant but can ignore because it doesn't hurt  5. DURATION: Always chest tightness, pain lasts seconds.  6. SEVERITY: Denies unless doing high impact work but stopped doing high impact workouts 2 months ago.  7. CARDIAC RISK FACTORS: Family history, smoker, otherwise denies any cardiac history  8. PULMONARY RISK FACTORS: Denies, family history of asthma  9. CAUSE: Unsure but would like CT angiogram  10. OTHER SYMPTOMS: Will feel nauseous if she pushes herself too hard.    11. PREGNANCY: No    Protocol Recommended Disposition:   See in Office Today    Recommendation: Recommending patient be seen today.  Pt agreeable to plan. Also has appointment scheduled with Dr. Blake next week.       Does the patient meet one of the following criteria for ADS visit consideration? No     Reason for Disposition   Chest pain(s) lasting a few seconds persists > 3 days    Additional Information   Negative: Fever > 100.4 F (38.0 C)   Negative: Patient says chest pain feels exactly the same as previously diagnosed 'heartburn' and describes burning in chest and accompanying sour taste in mouth   Negative: SEVERE chest pain   Negative: Chest pain lasting longer than 5 minutes and occurred in last 3 days (72 hours) (Exception: Feels exactly the same as previously diagnosed heartburn and has accompanying sour taste in mouth.)   Negative: Chest pain or 'angina'  comes and goes and is happening more often (increasing in frequency) or getting worse (increasing in severity) (Exception: Chest pains that last only a few seconds.)   Negative: Dizziness or lightheadedness   Negative: Coughing up blood   Negative: Patient sounds very sick or weak to the triager   Negative: Pain also in shoulder(s) or arm(s) or jaw   Negative: Difficulty breathing   Negative: Cocaine use within last 3 days   Negative: Major surgery in the past month   Negative: Hip or leg fracture (broken bone) in past month (or had cast on leg or ankle in past month)   Negative: Illness requiring prolonged bedrest in past month (e.g., immobilization, long hospital stay)   Negative: Long-distance travel in past month (e.g., car, bus, train, plane; with trip lasting 6 or more hours)   Negative: History of prior 'blood clot' in leg or lungs (i.e., deep vein thrombosis, pulmonary embolism)   Negative: History of inherited increased risk of blood clots (e.g., Factor 5 Leiden, Anti-thrombin 3, Protein C or Protein S deficiency, Prothrombin mutation)   Negative: Cancer treatment in the past two months (or has cancer now)   Negative: Heart beating irregularly or very rapidly    Protocols used: Chest Pain-A-[place]

## 2024-11-13 ENCOUNTER — HOSPITAL ENCOUNTER (EMERGENCY)
Facility: CLINIC | Age: 54
Discharge: HOME | End: 2024-11-13
Attending: EMERGENCY MEDICINE | Admitting: EMERGENCY MEDICINE
Payer: COMMERCIAL

## 2024-11-13 VITALS
RESPIRATION RATE: 16 BRPM | DIASTOLIC BLOOD PRESSURE: 80 MMHG | SYSTOLIC BLOOD PRESSURE: 140 MMHG | OXYGEN SATURATION: 100 % | HEART RATE: 80 BPM | TEMPERATURE: 97.3 F

## 2024-11-13 DIAGNOSIS — R07.89 ATYPICAL CHEST PAIN: ICD-10-CM

## 2024-11-13 LAB
ALBUMIN SERPL BCG-MCNC: 5 G/DL (ref 3.5–5.2)
ALP SERPL-CCNC: 89 U/L (ref 40–150)
ALT SERPL W P-5'-P-CCNC: 23 U/L (ref 0–50)
ANION GAP SERPL CALCULATED.3IONS-SCNC: 14 MMOL/L (ref 7–15)
AST SERPL W P-5'-P-CCNC: 27 U/L (ref 0–45)
BASOPHILS # BLD AUTO: 0.1 10E3/UL (ref 0–0.2)
BASOPHILS NFR BLD AUTO: 1 %
BILIRUB SERPL-MCNC: 0.8 MG/DL
BUN SERPL-MCNC: 9.7 MG/DL (ref 6–20)
CALCIUM SERPL-MCNC: 10.4 MG/DL (ref 8.8–10.4)
CHLORIDE SERPL-SCNC: 105 MMOL/L (ref 98–107)
CREAT SERPL-MCNC: 0.72 MG/DL (ref 0.51–0.95)
D DIMER PPP FEU-MCNC: <0.27 UG/ML FEU (ref 0–0.5)
EGFRCR SERPLBLD CKD-EPI 2021: >90 ML/MIN/1.73M2
EOSINOPHIL # BLD AUTO: 0.2 10E3/UL (ref 0–0.7)
EOSINOPHIL NFR BLD AUTO: 1 %
ERYTHROCYTE [DISTWIDTH] IN BLOOD BY AUTOMATED COUNT: 12.5 % (ref 10–15)
GLUCOSE SERPL-MCNC: 93 MG/DL (ref 70–99)
HCO3 SERPL-SCNC: 24 MMOL/L (ref 22–29)
HCT VFR BLD AUTO: 44 % (ref 35–47)
HGB BLD-MCNC: 15.5 G/DL (ref 11.7–15.7)
IMM GRANULOCYTES # BLD: 0.1 10E3/UL
IMM GRANULOCYTES NFR BLD: 1 %
LYMPHOCYTES # BLD AUTO: 2.8 10E3/UL (ref 0.8–5.3)
LYMPHOCYTES NFR BLD AUTO: 21 %
MCH RBC QN AUTO: 30.8 PG (ref 26.5–33)
MCHC RBC AUTO-ENTMCNC: 35.2 G/DL (ref 31.5–36.5)
MCV RBC AUTO: 87 FL (ref 78–100)
MONOCYTES # BLD AUTO: 0.5 10E3/UL (ref 0–1.3)
MONOCYTES NFR BLD AUTO: 4 %
NEUTROPHILS # BLD AUTO: 9.7 10E3/UL (ref 1.6–8.3)
NEUTROPHILS NFR BLD AUTO: 72 %
NRBC # BLD AUTO: 0 10E3/UL
NRBC BLD AUTO-RTO: 0 /100
NT-PROBNP SERPL-MCNC: 54 PG/ML (ref 0–900)
PLATELET # BLD AUTO: 320 10E3/UL (ref 150–450)
POTASSIUM SERPL-SCNC: 3.9 MMOL/L (ref 3.4–5.3)
PROT SERPL-MCNC: 8 G/DL (ref 6.4–8.3)
RBC # BLD AUTO: 5.04 10E6/UL (ref 3.8–5.2)
SODIUM SERPL-SCNC: 143 MMOL/L (ref 135–145)
TROPONIN T SERPL HS-MCNC: <6 NG/L
WBC # BLD AUTO: 13.3 10E3/UL (ref 4–11)

## 2024-11-13 PROCEDURE — 36415 COLL VENOUS BLD VENIPUNCTURE: CPT | Performed by: PHYSICIAN ASSISTANT

## 2024-11-13 PROCEDURE — 83880 ASSAY OF NATRIURETIC PEPTIDE: CPT | Performed by: PHYSICIAN ASSISTANT

## 2024-11-13 PROCEDURE — 84132 ASSAY OF SERUM POTASSIUM: CPT | Performed by: PHYSICIAN ASSISTANT

## 2024-11-13 PROCEDURE — 93005 ELECTROCARDIOGRAM TRACING: CPT | Mod: 59 | Performed by: EMERGENCY MEDICINE

## 2024-11-13 PROCEDURE — 84484 ASSAY OF TROPONIN QUANT: CPT | Performed by: PHYSICIAN ASSISTANT

## 2024-11-13 PROCEDURE — 85379 FIBRIN DEGRADATION QUANT: CPT | Performed by: PHYSICIAN ASSISTANT

## 2024-11-13 PROCEDURE — 85014 HEMATOCRIT: CPT | Performed by: PHYSICIAN ASSISTANT

## 2024-11-13 PROCEDURE — 93308 TTE F-UP OR LMTD: CPT | Performed by: EMERGENCY MEDICINE

## 2024-11-13 PROCEDURE — 84155 ASSAY OF PROTEIN SERUM: CPT | Performed by: PHYSICIAN ASSISTANT

## 2024-11-13 PROCEDURE — 99284 EMERGENCY DEPT VISIT MOD MDM: CPT | Mod: 25

## 2024-11-13 PROCEDURE — 85004 AUTOMATED DIFF WBC COUNT: CPT | Performed by: PHYSICIAN ASSISTANT

## 2024-11-13 PROCEDURE — 99284 EMERGENCY DEPT VISIT MOD MDM: CPT | Performed by: PHYSICIAN ASSISTANT

## 2024-11-13 ASSESSMENT — COLUMBIA-SUICIDE SEVERITY RATING SCALE - C-SSRS
1. IN THE PAST MONTH, HAVE YOU WISHED YOU WERE DEAD OR WISHED YOU COULD GO TO SLEEP AND NOT WAKE UP?: NO
2. HAVE YOU ACTUALLY HAD ANY THOUGHTS OF KILLING YOURSELF IN THE PAST MONTH?: NO
6. HAVE YOU EVER DONE ANYTHING, STARTED TO DO ANYTHING, OR PREPARED TO DO ANYTHING TO END YOUR LIFE?: NO

## 2024-11-13 ASSESSMENT — ACTIVITIES OF DAILY LIVING (ADL)
ADLS_ACUITY_SCORE: 0

## 2024-11-13 NOTE — ED PROVIDER NOTES
"       [place] EMERGENCY DEPARTMENT ([facility])    11/13/24       ED PROVIDER NOTE   History     Chief Complaint   Patient presents with    Chest Pain     HPI  Sidra Macedo is a 54 year old female with history of trophoblastic neoplasm (2006) and tobacco use who presents to the emergency department for evaluation of chest pressure.  Patient presents alone.  Patient states over the last 2 weeks she has been experiencing ongoing constant chest tightness localized to the midsternal area.  She states that this feels worse when she takes in a big breath.  She notes that she experiences \"chest pain\" when she has been using her stationary bike at higher resistance level which is new for her when she also experiences nausea.  She denies any associated fevers or new cough.  No hemoptysis.  She states she has also had some dyspnea as well in the last 2 weeks or so.  She states she has had some fatigue and brain fog as well over the last few weeks.  She notes no vomiting, other associated symptoms.  No history of heart disease.  She has never had an echocardiogram or stress test.  She smokes has no history of hypertension hyperlipidemia or diabetes reports a history of her dad at age 50 having a prior MI.  No history of VTE recent surgeries or immobilizations hemoptysis hormone use or unilateral leg swelling or pain.    Per chart review, patient presented to urgent care on 11/7 for fatigue, shortness of breath, headache, and chest pain. Medrol Dosepak was prescribed for headache and albuterol was prescribed for shortness of breath. A chest xray was performed yesterday with no notable findings.       Past Medical History  Past Medical History:   Diagnosis Date    Headache(784.0)     intermittent migraines, treats with tylenol    Molar pregnancy      Past Surgical History:   Procedure Laterality Date    [facility] NONSPECIFIC PROCEDURE      ACL repair right knee     albuterol (PROAIR HFA/PROVENTIL HFA/VENTOLIN HFA) 108 " (90 Base) MCG/ACT inhaler  Cyanocobalamin (VITAMIN B 12 PO)  fluticasone (FLONASE) 50 MCG/ACT nasal spray  nicotine (NICODERM CQ) 14 MG/24HR 24 hr patch  nicotine (NICODERM CQ) 21 MG/24HR 24 hr patch  nicotine (NICODERM CQ) 7 MG/24HR 24 hr patch  VITAMIN D, CHOLECALCIFEROL, PO  VITAMIN E PO      No Known Allergies  Family History  Family History   Problem Relation Age of Onset    Respiratory Mother         asthma    Breast Cancer Mother 63        BRCA negative    Heart Disease Father 50        bypass    Alcohol/Drug Father     Coronary Artery Disease Early Onset Paternal Grandfather     Breast Cancer Paternal Grandmother     Ovarian Cancer Paternal Grandmother     Liver Cancer Paternal Grandmother     Heart Disease Maternal Grandmother         CHF    Respiratory Maternal Grandmother         as    Cancer Maternal Grandmother         lung    Alcohol/Drug Maternal Grandmother     Neurologic Disorder Brother         seizures    Asthma Brother      Social History   Social History     Tobacco Use    Smoking status: Light Smoker     Current packs/day: 0.00     Average packs/day: 1 pack/day for 30.0 years (30.0 ttl pk-yrs)     Types: Cigarettes     Start date: 1975     Last attempt to quit: 2005     Years since quittin.2    Smokeless tobacco: Never   Substance Use Topics    Alcohol use: No     Alcohol/week: 0.0 standard drinks of alcohol    Drug use: No      A medically appropriate review of systems was performed with pertinent positives and negatives noted in the HPI, and all other systems negative.    Physical Exam   BP: (!) 149/88  Pulse: 85  Temp: 97.3  F (36.3  C)  Resp: 18  SpO2: 100 %  Physical Exam    GENERAL APPEARANCE: The patient is well developed, well appearing, and in no acute distress.  HEAD:  Normocephalic and atraumatic.   EENT: Voice normal.  NECK: Trachea is midline.No lymphadenopathy or tenderness.  LUNGS: Breath sounds are equal and clear bilaterally. No wheezes, rhonchi, or rales.  HEART:  Regular rate and normal rhythm.  EXTREMITIES: No cyanosis, clubbing, or edema.  NEUROLOGIC: No focal sensory or motor deficits are noted.  PSYCHIATRIC: The patient is awake, alert.  Appropriate mood and affect.  SKIN: Warm, dry, and well perfused. Good turgor.      ED Course, Procedures, & Data    EKG 11/13/2024:    Sinus rhythm, ventricular rate 93 QTc 442.  Rhythm is regular.  There is a P wave before every QRS complex.  No visible ST elevation or depression to suggest ischemia.     Results for orders placed or performed during the hospital encounter of 11/13/24   POC US ECHO LIMITED     Status: None (In process)    Impression    Limited Bedside Cardiac Ultrasound, performed and interpreted by me.   Indication: Chest Pain.  apical 4 chamber and subcostal views were acquired.   Image quality was satisfactory.    Findings:    Global left ventricular function appears intact.  Chambers do not appear dilated.  There is no evidence of free fluid within the pericardium.    IMPRESSION: Grossly normal left ventricular function and chamber size.  No pericardial effusion..      Comprehensive metabolic panel     Status: Normal   Result Value Ref Range    Sodium 143 135 - 145 mmol/L    Potassium 3.9 3.4 - 5.3 mmol/L    Carbon Dioxide (CO2) 24 22 - 29 mmol/L    Anion Gap 14 7 - 15 mmol/L    Urea Nitrogen 9.7 6.0 - 20.0 mg/dL    Creatinine 0.72 0.51 - 0.95 mg/dL    GFR Estimate >90 >60 mL/min/1.73m2    Calcium 10.4 8.8 - 10.4 mg/dL    Chloride 105 98 - 107 mmol/L    Glucose 93 70 - 99 mg/dL    Alkaline Phosphatase 89 40 - 150 U/L    AST 27 0 - 45 U/L    ALT 23 0 - 50 U/L    Protein Total 8.0 6.4 - 8.3 g/dL    Albumin 5.0 3.5 - 5.2 g/dL    Bilirubin Total 0.8 <=1.2 mg/dL   D dimer quantitative     Status: Normal   Result Value Ref Range    D-Dimer Quantitative <0.27 0.00 - 0.50 ug/mL FEU    Narrative    This D-dimer assay is intended for use in conjunction with a clinical pretest probability assessment model to exclude pulmonary  embolism (PE) and deep venous thrombosis (DVT) in outpatients suspected of PE or DVT. The cut-off value is 0.50 ug/mL FEU.    For patients 50 years of age or older, the application of age-adjusted cut-off values for D-Dimer may increase the specificity without significant effect on sensitivity. The literature suggested calculation age adjusted cut-off in ug/L = age in years x 10 ug/L. The results in this laboratory are reported as ug/mL rather than ug/L. The calculation for age adjusted cut off in ug/mL= age in years x 0.01 ug/mL. For example, the cut off for a 76 year old male is 76 x 0.01 ug/mL = 0.76 ug/mL (760 ug/L).    M Fina et al. Age adjusted D-dimer cut-off levels to rule out pulmonary embolism: The ADJUST-PE Study. MERY 2014;311:2953-7144.; HJ Casey et al. Diagnostic accuracy of conventional or age adjusted D-dimer cutoff values in older patients with suspected venous thromboembolism. Systemic review and meta-analysis. BMJ 2013:346:f2492.   Troponin T, High Sensitivity     Status: Normal   Result Value Ref Range    Troponin T, High Sensitivity <6 <=14 ng/L   Nt probnp inpatient     Status: Normal   Result Value Ref Range    N terminal Pro BNP Inpatient 54 0 - 900 pg/mL   CBC with platelets and differential     Status: Abnormal   Result Value Ref Range    WBC Count 13.3 (H) 4.0 - 11.0 10e3/uL    RBC Count 5.04 3.80 - 5.20 10e6/uL    Hemoglobin 15.5 11.7 - 15.7 g/dL    Hematocrit 44.0 35.0 - 47.0 %    MCV 87 78 - 100 fL    MCH 30.8 26.5 - 33.0 pg    MCHC 35.2 31.5 - 36.5 g/dL    RDW 12.5 10.0 - 15.0 %    Platelet Count 320 150 - 450 10e3/uL    % Neutrophils 72 %    % Lymphocytes 21 %    % Monocytes 4 %    % Eosinophils 1 %    % Basophils 1 %    % Immature Granulocytes 1 %    NRBCs per 100 WBC 0 <1 /100    Absolute Neutrophils 9.7 (H) 1.6 - 8.3 10e3/uL    Absolute Lymphocytes 2.8 0.8 - 5.3 10e3/uL    Absolute Monocytes 0.5 0.0 - 1.3 10e3/uL    Absolute Eosinophils 0.2 0.0 - 0.7 10e3/uL    Absolute  Basophils 0.1 0.0 - 0.2 10e3/uL    Absolute Immature Granulocytes 0.1 <=0.4 10e3/uL    Absolute NRBCs 0.0 10e3/uL   EKG 12-lead, tracing only     Status: None (Preliminary result)   Result Value Ref Range    Systolic Blood Pressure  mmHg    Diastolic Blood Pressure  mmHg    Ventricular Rate 93 BPM    Atrial Rate 93 BPM    MD Interval 136 ms    QRS Duration 66 ms     ms    QTc 442 ms    P Axis 77 degrees    R AXIS 18 degrees    T Axis 32 degrees    Interpretation ECG       Sinus rhythm  Low voltage QRS  Cannot rule out Anterior infarct , age undetermined  Abnormal ECG     CBC with platelets differential     Status: Abnormal    Narrative    The following orders were created for panel order CBC with platelets differential.  Procedure                               Abnormality         Status                     ---------                               -----------         ------                     CBC with platelets and d...[[id number]]  Abnormal            Final result                 Please view results for these tests on the individual orders.     Medications - No data to display  Labs Ordered and Resulted from Time of ED Arrival to Time of ED Departure   CBC WITH PLATELETS AND DIFFERENTIAL - Abnormal       Result Value    WBC Count 13.3 (*)     RBC Count 5.04      Hemoglobin 15.5      Hematocrit 44.0      MCV 87      MCH 30.8      MCHC 35.2      RDW 12.5      Platelet Count 320      % Neutrophils 72      % Lymphocytes 21      % Monocytes 4      % Eosinophils 1      % Basophils 1      % Immature Granulocytes 1      NRBCs per 100 WBC 0      Absolute Neutrophils 9.7 (*)     Absolute Lymphocytes 2.8      Absolute Monocytes 0.5      Absolute Eosinophils 0.2      Absolute Basophils 0.1      Absolute Immature Granulocytes 0.1      Absolute NRBCs 0.0     COMPREHENSIVE METABOLIC PANEL - Normal    Sodium 143      Potassium 3.9      Carbon Dioxide (CO2) 24      Anion Gap 14      Urea Nitrogen 9.7      Creatinine 0.72       GFR Estimate >90      Calcium 10.4      Chloride 105      Glucose 93      Alkaline Phosphatase 89      AST 27      ALT 23      Protein Total 8.0      Albumin 5.0      Bilirubin Total 0.8     D DIMER QUANTITATIVE - Normal    D-Dimer Quantitative <0.27     TROPONIN T, HIGH SENSITIVITY - Normal    Troponin T, High Sensitivity <6     NT PROBNP INPATIENT - Normal    N terminal Pro BNP Inpatient 54       POC US ECHO LIMITED   Preliminary Result   Limited Bedside Cardiac Ultrasound, performed and interpreted by me.    Indication: Chest Pain.   apical 4 chamber and subcostal views were acquired.    Image quality was satisfactory.      Findings:     Global left ventricular function appears intact.   Chambers do not appear dilated.   There is no evidence of free fluid within the pericardium.      IMPRESSION: Grossly normal left ventricular function and chamber size.  No pericardial effusion..                 Critical care was not performed.     Medical Decision Making  The patient's presentation was of moderate complexity (an undiagnosed new problem with uncertain prognosis).    The patient's evaluation involved:  review of 3+ test result(s) ordered prior to this encounter (see separate area of note for details)  ordering and/or review of 3+ test(s) in this encounter (see separate area of note for details)    The patient's management necessitated only low risk treatment.    Assessment & Plan    This is a 54-year-old female present with concern for several weeks of chest tightness, reportedly exertional and pleuritic in nature.  She has reassuring vitals on presentation.  Exam shows clear lungs and is otherwise unremarkable.  Consider broad differential including possible ACS pneumothorax PE aortic emergency pneumonia amongst others.  Will initiate workup including EKG cardiac enzymes, reviewed recent chest x-ray yesterday showing clear lungs will defer repeat imaging at this time.    Initial EKG without findings for ischemia  or other arrhythmia.  Labs reviewed remarkable for CBC leukocytosis 13.3, normal troponin, D-dimer suggest against ACS and pulmonary embolism.  Chemistry normal.  At this point patient has a heart score that puts her in the low category do not feel admission at this time is warranted.  She has outpatient follow-up scheduled with primary care already.  She was made aware of return precautions.  Patient has no other questions or concerns at this time.  Red flag signs were addressed, and they were in agreement with the patient care plan provided.    Patient seen and discussed with attending physician , who agrees with my plan of care.    I have reviewed the nursing notes. I have reviewed the findings, diagnosis, plan and need for follow up with the patient.    Discharge Medication List as of 11/13/2024  7:37 PM          Final diagnoses:   Atypical chest pain       TANG Maravilla  [facility] EMERGENCY DEPARTMENT  11/13/2024     Ita Valente PA-C  11/13/24 2027

## 2024-11-13 NOTE — ED TRIAGE NOTES
Pt ambulatory to triage with c/o x2 months of chest pain. Pt endorsing swelling of hands and feet since the summer. Now having increased chest pressure & nausea. Pt sates that her back is also aching.      Triage Assessment (Adult)       Row Name 11/13/24 2150          Triage Assessment    Airway WDL WDL        Respiratory WDL    Respiratory WDL WDL        Skin Circulation/Temperature WDL    Skin Circulation/Temperature WDL WDL        Cardiac WDL    Cardiac WDL X;chest pain        Chest Pain Assessment    Chest Pain Location midsternal     Character pressure        Peripheral/Neurovascular WDL    Peripheral Neurovascular WDL WDL        Cognitive/Neuro/Behavioral WDL    Cognitive/Neuro/Behavioral WDL WDL

## 2024-11-14 LAB
ATRIAL RATE - MUSE: 93 BPM
DIASTOLIC BLOOD PRESSURE - MUSE: NORMAL MMHG
INTERPRETATION ECG - MUSE: NORMAL
P AXIS - MUSE: 77 DEGREES
PR INTERVAL - MUSE: 136 MS
QRS DURATION - MUSE: 66 MS
QT - MUSE: 356 MS
QTC - MUSE: 442 MS
R AXIS - MUSE: 18 DEGREES
SYSTOLIC BLOOD PRESSURE - MUSE: NORMAL MMHG
T AXIS - MUSE: 32 DEGREES
VENTRICULAR RATE- MUSE: 93 BPM

## 2024-11-14 NOTE — DISCHARGE INSTRUCTIONS
You had a reassuring evaluation in the ED today. Please follow up as scheduled for additional testing including the CT calcium scoring test. Return if you have worsening symptoms or other new emergent concerns.

## 2024-11-14 NOTE — ED NOTES
Pt verbalizes understanding of all discharge instructions, follow up. Pt ambulatory at time of discharge with son coming to pick her up. Paperwork in hand.

## 2024-11-19 ENCOUNTER — HOSPITAL ENCOUNTER (OUTPATIENT)
Dept: CT IMAGING | Facility: CLINIC | Age: 54
Discharge: HOME | End: 2024-11-19
Payer: COMMERCIAL

## 2024-11-19 DIAGNOSIS — Z82.49 FAMILY HISTORY OF CARDIOVASCULAR DISEASE: ICD-10-CM

## 2024-11-19 DIAGNOSIS — F17.200 SMOKER: ICD-10-CM

## 2024-11-19 PROCEDURE — 75571 CT HRT W/O DYE W/CA TEST: CPT

## 2024-11-19 PROCEDURE — 75571 CT HRT W/O DYE W/CA TEST: CPT | Mod: 26 | Performed by: INTERNAL MEDICINE

## 2024-11-20 ENCOUNTER — OFFICE VISIT (OUTPATIENT)
Dept: FAMILY MEDICINE | Facility: CLINIC | Age: 54
End: 2024-11-20
Payer: COMMERCIAL

## 2024-11-20 VITALS
WEIGHT: 140 LBS | HEART RATE: 84 BPM | BODY MASS INDEX: 26.43 KG/M2 | DIASTOLIC BLOOD PRESSURE: 82 MMHG | SYSTOLIC BLOOD PRESSURE: 137 MMHG | TEMPERATURE: 97.9 F | HEIGHT: 61 IN | RESPIRATION RATE: 12 BRPM | OXYGEN SATURATION: 18 %

## 2024-11-20 DIAGNOSIS — R06.09 DYSPNEA ON EXERTION: ICD-10-CM

## 2024-11-20 DIAGNOSIS — F17.200 TOBACCO USE DISORDER: ICD-10-CM

## 2024-11-20 DIAGNOSIS — R93.1 ELEVATED CORONARY ARTERY CALCIUM SCORE: ICD-10-CM

## 2024-11-20 DIAGNOSIS — R53.83 OTHER FATIGUE: ICD-10-CM

## 2024-11-20 DIAGNOSIS — R07.89 CHEST TIGHTNESS: Primary | ICD-10-CM

## 2024-11-20 PROBLEM — R59.1 LYMPHADENOPATHY: Status: ACTIVE | Noted: 2024-11-06

## 2024-11-20 PROBLEM — G44.89 OTHER HEADACHE SYNDROME: Status: ACTIVE | Noted: 2024-11-06

## 2024-11-20 PROBLEM — M25.50 PAIN IN JOINT INVOLVING MULTIPLE SITES: Status: ACTIVE | Noted: 2024-11-07

## 2024-11-20 PROBLEM — G44.89 OTHER HEADACHE SYNDROME: Status: RESOLVED | Noted: 2024-11-06 | Resolved: 2024-11-20

## 2024-11-20 PROBLEM — M25.50 PAIN IN JOINT INVOLVING MULTIPLE SITES: Status: RESOLVED | Noted: 2024-11-07 | Resolved: 2024-11-20

## 2024-11-20 PROBLEM — O01.9: Status: ACTIVE | Noted: 2024-11-06

## 2024-11-20 PROBLEM — R59.1 LYMPHADENOPATHY: Status: RESOLVED | Noted: 2024-11-06 | Resolved: 2024-11-20

## 2024-11-20 PROBLEM — R06.02 SOB (SHORTNESS OF BREATH): Status: RESOLVED | Noted: 2024-11-07 | Resolved: 2024-11-20

## 2024-11-20 PROBLEM — R06.02 SOB (SHORTNESS OF BREATH): Status: ACTIVE | Noted: 2024-11-07

## 2024-11-20 LAB
CV CALCIUM SCORE AGATSTON LM: 0
CV CALCIUM SCORING AGATSON LAD: 143
CV CALCIUM SCORING AGATSTON CX: 0
CV CALCIUM SCORING AGATSTON RCA: 0
CV CALCIUM SCORING AGATSTON TOTAL: 143

## 2024-11-20 PROCEDURE — 99203 OFFICE O/P NEW LOW 30 MIN: CPT | Performed by: FAMILY MEDICINE

## 2024-11-20 ASSESSMENT — ENCOUNTER SYMPTOMS
FATIGUE: 1
HEADACHES: 1

## 2024-11-20 NOTE — PROGRESS NOTES
"  Assessment & Plan     (R07.89) Chest tightness  (primary encounter diagnosis)  Comment: Multiple potential causes.   Plan: CT Coronary Artery Angio w Calcium Score             (R53.83) Other fatigue  Comment: Again multiple potential causes for  Plan: CT Coronary Artery Angio w Calcium Score             (R93.1) Elevated coronary artery calcium score  Comment: Patient is in the 75th percentile  Plan: CT Coronary Artery Angio w Calcium Score             (R06.09) Dyspnea on exertion  Comment: There could be an underlying cardiac issue the patient also probably has some underlying COPD as well  Plan: CT Coronary Artery Angio w Calcium Score             (F17.200) Tobacco use disorder  Comment: Patient does smoke a pack a day she is trying to quit  Plan:      PLAN:  1.  CT angiogram of the heart  2.  Of note depending on those results the patient is quite concerned about heart disease, and she will likely no matter what the results are needed either cardiac evaluation or further testing.  3.  The patient is using nicotine patches she is trying to cut back or quit smoking.  4.  Patient is behind on preventive maintenance issues.  5.  Patient will make an appointment in the near future for follow-up comorbidities and so forth.              Nicotine/Tobacco Cessation  She reports that she has been smoking cigarettes. She started smoking about 49 years ago. She has a 30 pack-year smoking history. She has never used smokeless tobacco.  Nicotine/Tobacco Cessation Plan  Pharmacotherapies : Nicotine patch      BMI  Estimated body mass index is 26.45 kg/m  as calculated from the following:    Height as of this encounter: 1.549 m (5' 1\").    Weight as of this encounter: 63.5 kg (140 lb).   Weight management plan: Discussed healthy diet and exercise guidelines          Mayank Valente is a 54 year old, presenting for the following health issues:  Establish Care, Fatigue (Tightness in the chest, started 3x weeks), Headache, and " ENRIQUE (11/13/2024)      11/20/2024     3:20 PM   Additional Questions   Roomed by Mahendra     Fatigue  Associated symptoms include fatigue and headaches.   Headache     History of Present Illness       Reason for visit:  Chest Tightness,severe fatigue and headache    She eats 2-3 servings of fruits and vegetables daily.She consumes 0 sweetened beverage(s) daily.She exercises with enough effort to increase her heart rate 30 to 60 minutes per day.  She exercises with enough effort to increase her heart rate 5 days per week.   She is taking medications regularly.     Patient comes in because for several months she has not been feeling well excessive fatigue she is actually cut back on how many hours she can work, patient reports that she is feeling more short of breath she gets short of breath with even fairly minimal exertion she was seen November 13 in the emergency room for the above symptoms, EKG was equivocal blood work was essentially normal though her white count was slightly up but no sign of a heart attack.    Patient just had a coronary calcium score that places her in the 75th percentile.    Patient also smokes a pack a day she is trying to quit    Patient is concerned in part because her father was diagnosed with heart disease at the age of 50 he apparently had some of the same symptoms that the patient had he apparently had normal stress test but when he had the angiogram performed that showed significant blockage and he underwent bypass surgery shortly thereafter.    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    Patient is behind on preventive maintenance issues which need to be addressed in the future of note the patient actually does not live here she [place] she gets to the cities and would prefer to doctor in this area.                          Objective    /82   Pulse 84   Temp 97.9  F (36.6  C) (Oral)   Resp 12   Ht 1.549 m (5' 1\")   Wt 63.5 kg (140 lb)   LMP 08/28/2017 (Exact Date)   SpO2 (!) 18%   BMI 26.45 kg/m    Body mass index is 26.45 kg/m .  Physical Exam               Signed Electronically by: Emre Blake MD    "

## 2024-11-25 RX ORDER — METOPROLOL TARTRATE 1 MG/ML
5-20 INJECTION, SOLUTION INTRAVENOUS
Status: DISCONTINUED | OUTPATIENT
Start: 2024-11-25 | End: 2024-12-10 | Stop reason: HOSPADM

## 2024-11-25 RX ORDER — DILTIAZEM HYDROCHLORIDE 5 MG/ML
10-15 INJECTION INTRAVENOUS
Status: DISCONTINUED | OUTPATIENT
Start: 2024-11-25 | End: 2024-12-10 | Stop reason: HOSPADM

## 2024-11-25 RX ORDER — NITROGLYCERIN 0.4 MG/1
0.4 TABLET SUBLINGUAL
Status: DISCONTINUED | OUTPATIENT
Start: 2024-11-25 | End: 2024-12-10 | Stop reason: HOSPADM

## 2024-12-09 ENCOUNTER — HOSPITAL ENCOUNTER (OUTPATIENT)
Dept: CT IMAGING | Facility: CLINIC | Age: 54
Discharge: HOME | End: 2024-12-09
Attending: FAMILY MEDICINE | Admitting: FAMILY MEDICINE
Payer: COMMERCIAL

## 2024-12-09 VITALS
WEIGHT: 140 LBS | SYSTOLIC BLOOD PRESSURE: 135 MMHG | BODY MASS INDEX: 26.43 KG/M2 | DIASTOLIC BLOOD PRESSURE: 68 MMHG | HEIGHT: 61 IN

## 2024-12-09 DIAGNOSIS — R53.83 OTHER FATIGUE: ICD-10-CM

## 2024-12-09 DIAGNOSIS — R07.89 CHEST TIGHTNESS: ICD-10-CM

## 2024-12-09 DIAGNOSIS — R93.1 ELEVATED CORONARY ARTERY CALCIUM SCORE: ICD-10-CM

## 2024-12-09 DIAGNOSIS — R06.09 DYSPNEA ON EXERTION: ICD-10-CM

## 2024-12-09 DIAGNOSIS — R93.1 ELEVATED CORONARY ARTERY CALCIUM SCORE: Primary | ICD-10-CM

## 2024-12-09 LAB — BSA FOR ECHO PROCEDURE: 0 M2

## 2024-12-09 PROCEDURE — 75574 CT ANGIO HRT W/3D IMAGE: CPT | Mod: 26 | Performed by: INTERNAL MEDICINE

## 2024-12-09 PROCEDURE — 250N000011 HC RX IP 250 OP 636: Performed by: FAMILY MEDICINE

## 2024-12-09 PROCEDURE — 250N000009 HC RX 250: Performed by: FAMILY MEDICINE

## 2024-12-09 PROCEDURE — 75574 CT ANGIO HRT W/3D IMAGE: CPT

## 2024-12-09 PROCEDURE — 250N000013 HC RX MED GY IP 250 OP 250 PS 637: Performed by: FAMILY MEDICINE

## 2024-12-09 RX ORDER — IOPAMIDOL 755 MG/ML
100 INJECTION, SOLUTION INTRAVASCULAR ONCE
Status: COMPLETED | OUTPATIENT
Start: 2024-12-09 | End: 2024-12-09

## 2024-12-09 RX ADMIN — METOROPROLOL TARTRATE 5 MG: 5 INJECTION, SOLUTION INTRAVENOUS at 14:41

## 2024-12-09 RX ADMIN — NITROGLYCERIN 0.4 MG: 0.4 TABLET SUBLINGUAL at 14:40

## 2024-12-09 RX ADMIN — IOPAMIDOL 100 ML: 755 INJECTION, SOLUTION INTRAVENOUS at 14:46

## 2024-12-09 RX ADMIN — METOROPROLOL TARTRATE 5 MG: 5 INJECTION, SOLUTION INTRAVENOUS at 14:37

## 2024-12-09 NOTE — LETTER
December 9, 2024      Sidra JESSICA Remington  [address] [facility] [address]        Dear ,    We are writing to inform you of your test results.  As noted there is some plaque buildup in one of the coronary arteries, this may not even be causing symptoms but as discussed we will have you see cardiology.        Resulted Orders   CT Coronary Artery Angio   Result Value Ref Range    BSA 0.00 m2    Narrative    Left main is normal  Mixed calcified/soft plaque in the proximal LAD with < 50% stenosis   Circumflex coronary artery and its branches are normal   Right coronary artery is a large dominant vessel and is normal   Aorta is normal in size where seen   No LA thrombus  No pericardial effusion or calcification           If you have any questions or concerns, please call the clinic at the number listed above.       Sincerely,      Emre Blake MD

## 2024-12-09 NOTE — PROGRESS NOTES
Coronary CTA with Calcium Score:  Noted pt had calcium score on 11/19/24 of 143.  Pt requested only CCTA be performed today and calcium score not be performed.  Tolerated CCTA well.  VSS.  Interpretation pending.   Silver Nitrate Text: The wound bed was treated with silver nitrate after the biopsy was performed.

## 2024-12-09 NOTE — LETTER
December 9, 2024      Sidra Macedo  [address] [facility] [address]        Dear ,    We are writing to inform you of your test results.  The CT scan of the chest also reveals that you do have some emphysema as well.        Resulted Orders   Radiologist Consult For Cardiology    Narrative    OVERREAD: DETAILED [facility] EXTRACARDIAC OVERREAD OF CARDIAC CT   LOCATION: [facility]  DATE: 12/9/2024    INDICATION:  Chest tightness, Other fatigue, Elevated coronary artery calcium score, Dyspnea on exertion  TECHNIQUE: Dose reduction techniques were used.  COMPARISON: 11/19/2024.    FINDINGS:    LIMITED CHEST: Pulmonary emphysema.  LIMITED MEDIASTINUM: Negative.  LIMITED UPPER ABDOMEN: Incidental hepatic cysts.      Impression    IMPRESSION:    1.  No significant incidental extracardiac findings.  2.  Please refer to cardiologist's dictation for the cardiac CT report.   CT Coronary Artery Angio   Result Value Ref Range    BSA 0.00 m2    Narrative    Left main is normal  Mixed calcified/soft plaque in the proximal LAD with < 50% stenosis   Circumflex coronary artery and its branches are normal   Right coronary artery is a large dominant vessel and is normal   Aorta is normal in size where seen   No LA thrombus  No pericardial effusion or calcification           If you have any questions or concerns, please call the clinic at the number listed above.       Sincerely,      Emre Blake MD

## 2024-12-10 ENCOUNTER — TELEPHONE (OUTPATIENT)
Dept: CARDIOLOGY | Facility: CLINIC | Age: 54
End: 2024-12-10
Payer: COMMERCIAL

## 2024-12-10 NOTE — TELEPHONE ENCOUNTER
[facility] Call Center    Phone Message    May a detailed message be left on voicemail: yes     Reason for Call: Other:       This encounter is being sent to inform the clinic that this patient has a referral from Dr Blake for the diagnoses of     Elevated coronary artery calcium score [R93.1]  Chest tightness [R07.89]    and has requested that this patient be seen within routine and/or with any gen cardiologist.  Based on the availability of our provider(s), we are unable to accommodate this request.    Were all sites offered this patient?  Offer all available sites, no patient only wanting [place]    Does scheduling algorithm request to schedule next available?  Patient appointment has not been scheduled.  Please review the referral request for accommodation and contact the patient.  If unable to accommodate, please resubmit a referral and indicate a preferred partner or affiliate location using Provider Finder or Scheduling Instructions field.  Next available is march 2025. Patient is not comfortable waiting until March 2025 due to her dx of chest tightness.        Action Taken: Other: cardio    Travel Screening: Not Applicable     Date of Service:

## 2024-12-10 NOTE — TELEPHONE ENCOUNTER
Patient Contacted for the patient to call back and schedule the following:    Appointment type: New Cardio  Provider: Dr. Valencia  Return date: 01/21  Specialty phone number: [phone] opt 1  Additional appointment(s) needed: N/A  Additonal Notes: N/A

## 2024-12-11 ENCOUNTER — TELEPHONE (OUTPATIENT)
Dept: CARDIOLOGY | Facility: CLINIC | Age: 54
End: 2024-12-11
Payer: COMMERCIAL

## 2024-12-11 NOTE — TELEPHONE ENCOUNTER
Left Voicemail (1st Attempt) and Sent Mychart (1st Attempt) for the patient to call back and schedule the following:    Appointment type: Return Cardio  Provider:   Return date: Next Available   Specialty phone number: [phone] opt 1  Additional appointment(s) needed: N/A  Additonal Notes: Need to schedule patient for next available

## 2024-12-13 ENCOUNTER — OFFICE VISIT (OUTPATIENT)
Dept: CARDIOLOGY | Facility: CLINIC | Age: 54
End: 2024-12-13
Payer: COMMERCIAL

## 2024-12-13 VITALS
OXYGEN SATURATION: 100 % | HEIGHT: 61 IN | BODY MASS INDEX: 25.86 KG/M2 | SYSTOLIC BLOOD PRESSURE: 149 MMHG | WEIGHT: 137 LBS | HEART RATE: 98 BPM | DIASTOLIC BLOOD PRESSURE: 78 MMHG

## 2024-12-13 DIAGNOSIS — R00.2 PALPITATIONS: ICD-10-CM

## 2024-12-13 DIAGNOSIS — R07.89 CHEST TIGHTNESS: ICD-10-CM

## 2024-12-13 DIAGNOSIS — R93.1 ELEVATED CORONARY ARTERY CALCIUM SCORE: Primary | ICD-10-CM

## 2024-12-13 ASSESSMENT — PAIN SCALES - GENERAL: PAINLEVEL_OUTOF10: NO PAIN (0)

## 2024-12-13 NOTE — PROGRESS NOTES
[facility]      REFERRING PROVIDER:  Emre Blake MD    DATE OF VISIT:  December 13, 2024    REASON FOR VISIT:  Evaluation of chest tightness in the setting of single-vessel coronary artery disease on coronary CT angiography performed on 12/9/2024    HISTORY OF PRESENT ILLNESS:   Ms. Sidra Macedo is a 54 year old  female with past medical history significant for longstanding atypical chest pain, chronic cigarette smoking, allergic rhinitis, history of trophoblastic neoplasm.    She has a longstanding history of atypical chest pain dating back to 2011 when she experienced her first episode of excruciating chest pain.  The spontaneous resolution of the chest pain was followed by chest tightness which is described as constant tightness involving the entire anterior chest but more pronounced in the mid sternum.  This again apparently subsequently spontaneously subsided.  However, she then continued to have recurrent chest tightness of varying duration.  Her chest tightness has become more frequent and longer lasting over the years.  She ultimately presented herself to emergency department on 11/13/2024 for evaluation because of a 2-week history of constant chest tightness especially in the mid sternum.  Her chest tightness/pain was felt to be noncardiac in origin.  She later underwent a coronary CT calcium scoring study on 11/19/2024; this was reported to show a calcium score of 143 in the LAD territory which placed her at the 75th percentile for matched age and sex.  A subsequent coronary CT angiography performed on 12/9/2024 demonstrated mild to moderate single-vessel proximal LAD disease with less than 50% stenosis.  She was referred to the cardiology clinic for evaluation.    Although the  "patient denies other types of chest pain, she does point out that her father did present with myocardial infarction leading to coronary artery bypass grafting at 50 years of age after initially told to have no evidence of coronary artery disease in the setting of some atypical chest discomfort.  She therefore is concerned and also convinced that her \"atypical\" chest pain/tightness may not be \"noncardiac\" as felt by the emergency department provider.  She also reports nonspecific tightness/discomfort affecting her face in addition to \"head not quite clear\" which again she believes is probably a manifestation of some underlying vascular disease.    She also endorses progressive reduced physical stamina and activity endurance over the last few years.  While she denies shortness of breath at rest, she endorses noticeable shortness of breath on mild to moderate physical exertion.  She otherwise denies other significant symptoms referable to the cardiovascular system.  In particular, no recent history of chest pain or dyspnea on exertion.  No recent history of significant ankle edema, orthopnea, or paroxysmal nocturnal dyspnea.  She is occasionally aware of faster than expected heartbeat but denies history of sustained rapid heart pulsation.  No recent history of severe lightheadedness, syncope or near syncope.    Medications, personal, family, and social history reviewed.    PAST MEDICAL HISTORY:  Past Medical History:   Diagnosis Date    Headache(784.0)     intermittent migraines, treats with tylenol    Molar pregnancy      PAST SURGICAL HISTORY:  Past Surgical History:   Procedure Laterality Date    [facility] NONSPECIFIC PROCEDURE      ACL repair right knee     CURRENT MEDICATIONS:  Current Outpatient Medications   Medication Sig Dispense Refill    albuterol (PROAIR HFA/PROVENTIL HFA/VENTOLIN HFA) 108 (90 Base) MCG/ACT inhaler Inhale 2 puffs into the lungs every 6 hours (Patient not taking: Reported on 12/13/2024) 18 g 0    " Cyanocobalamin (VITAMIN B 12 PO)  (Patient not taking: Reported on 2024)      fluticasone (FLONASE) 50 MCG/ACT nasal spray U 1 SPRAY NASALLY BID UTD (Patient not taking: Reported on 2024)      nicotine (NICODERM CQ) 14 MG/24HR 24 hr patch Place 1 patch onto the skin every 24 hours (Patient not taking: Reported on 2024) 30 patch 0    nicotine (NICODERM CQ) 21 MG/24HR 24 hr patch Place 1 patch onto the skin every 24 hours (Patient not taking: Reported on 2024) 30 patch 0    nicotine (NICODERM CQ) 7 MG/24HR 24 hr patch Place 1 patch onto the skin every 24 hours (Patient not taking: Reported on 2024) 30 patch 4    VITAMIN D, CHOLECALCIFEROL, PO Take by mouth daily (Patient not taking: Reported on 2024)      VITAMIN E PO  (Patient not taking: Reported on 2024)       ALLERGIES:  No Known Allergies    FAMILY HISTORY:  Family History   Problem Relation Age of Onset    Respiratory Mother         asthma    Breast Cancer Mother 63        BRCA negative    Heart Disease Father 50        bypass    Alcohol/Drug Father     Coronary Artery Disease Early Onset Paternal Grandfather     Breast Cancer Paternal Grandmother     Ovarian Cancer Paternal Grandmother     Liver Cancer Paternal Grandmother     Heart Disease Maternal Grandmother         CHF    Respiratory Maternal Grandmother         as    Cancer Maternal Grandmother         lung    Alcohol/Drug Maternal Grandmother     Neurologic Disorder Brother         seizures    Asthma Brother      SOCIAL HISTORY:  Tobacco Use    Smoking status: Every Day     Current packs/day: 0.00     Average packs/day: 1 pack/day for 30.0 years (30.0 ttl pk-yrs)     Types: Cigarettes     Start date: 1975     Last attempt to quit: 2005     Years since quittin.3    Smokeless tobacco: Never   Vaping Use    Vaping status: Never Used   Substance Use Topics    Alcohol use: No     Alcohol/week: 0.0 standard drinks of alcohol    Drug use: No     CARDIAC RISK  "FACTORS:  Positive family history of premature coronary artery disease.  Chronic cigarette smoking (1 pack of cigarettes a day for 30 years)  She denies history of hypertension.  She does not have diabetes mellitus.  She does not know her cholesterol status.    REVIEW OF SYSTEMS:  Other than as stated under history of present illness and/or past medical history, comprehensive review of other systems was performed and was unremarkable.    PHYSICAL EXAMINATION:  BP (!) 149/78 (BP Location: Right arm, Patient Position: Sitting, Cuff Size: Adult Regular)   Pulse 98   Ht 1.549 m (5' 0.98\")   Wt 62.1 kg (137 lb)   LMP 08/28/2017 (Exact Date)   SpO2 100%   BMI 25.90 kg/m    GENERAL APPEARANCE: alert and in no acute distress  HEENT: no icterus, no central cyanosis  LYMPH/NECK: no adenopathy, no asymmetry, no appreciable neck mass.  RESPIRATORY: lungs clear to auscultation - no rales, rhonchi or wheezes, no use of accessory muscles, no retractions, respirations are unlabored, normal respiratory rate  CARDIOVASCULAR: Auscultation reveals normal heart sounds.  There is a grade 1/6 systolic murmur at the apex.  There is no diastolic murmur.  GI: soft, non tender.  No hepatosplenomegaly.  EXTREMITIES: No significant ankle edema.  NEURO: alert, normal speech,and affect  SKIN: no ecchymoses, no rashes    I have reviewed the labs and imaging results below and made my comment in the assessment and plan.    DIAGNOSTIC DATA:  CBC RESULTS:   Lab Results   Component Value Date    WBC 13.3 (H) 11/13/2024    WBC 16.3 (H) 09/13/2020    RBC 5.04 11/13/2024    RBC 4.60 09/13/2020    HGB 15.5 11/13/2024    HGB 14.1 09/13/2020    HCT 44.0 11/13/2024    HCT 40.2 09/13/2020    MCV 87 11/13/2024    MCV 87 09/13/2020    MCH 30.8 11/13/2024    MCH 30.7 09/13/2020    MCHC 35.2 11/13/2024    MCHC 35.1 09/13/2020    RDW 12.5 11/13/2024    RDW 12.2 09/13/2020     11/13/2024     09/13/2020     BMP RESULTS:  Lab Results   Component " Value Date     11/13/2024     09/13/2020    POTASSIUM 3.9 11/13/2024    POTASSIUM 3.4 09/13/2020    CHLORIDE 105 11/13/2024    CHLORIDE 108 09/13/2020    CO2 24 11/13/2024    CO2 27 09/13/2020    ANIONGAP 14 11/13/2024    ANIONGAP 5 09/13/2020    GLC 93 11/13/2024     (H) 09/13/2020    BUN 9.7 11/13/2024    BUN 16 09/13/2020    CR 0.72 11/13/2024    CR 0.75 09/13/2020    GFRESTIMATED >90 11/13/2024    GFRESTIMATED >90 09/13/2020    GFRESTBLACK >90 09/13/2020    ARI 10.4 11/13/2024    ARI 9.3 09/13/2020      Electrocardiogram  Twelve-lead electrocardiogram obtained on 11/13/2024 documented sinus rhythm with rate of 93 bpm.  There was normal GA interval of 136 ms.  There was normal QRS duration of 66 ms.  QT and QTc intervals were measured 356 and 442 ms respectively.  There was low voltage.  There was poor R wave progression.    Coronary CT calcium scoring  Coronary CT calcium scoring study performed on 11/19/2024 was reported to show a calcium score of 143 in the LAD territory.  This was reported as 75th percentile for age and sex.    Coronary CT angiography  Coronary CT angiography performed on 12/9/2024 was reported to show mixed calcific/soft plaque in the proximal LAD with less than 50% stenosis.  There was no stenosis of the left main, the left circumflex and the right coronary artery.  There was normal aorta.  There was no left atrial thrombus.    ASSESSMENT AND PLAN:   I first reviewed with the patient the findings on the recent calcium CT scoring study dated 11/19/2024 and the coronary CT angiography dated 12/9/2024.  I then told the patient that the finding of mild to moderate single-vessel proximal LAD disease on the coronary CT angiography would support the clinical impression of atypical chest pain of noncardiac origin.  I did acknowledge that the differential diagnosis of microvascular coronary artery disease not demonstrated on the coronary CT angiography cannot be ruled  out.  Regardless, I reviewed and discussed with the patient the benefits and importance of lifestyle modification i.e. cessation of cigarette smoking, treatment of hypertension if present, healthy diet, regular exercise, keeping LDL level less than 100 mg percent etc.    A. On this basis, I suggested a baseline lipid profile and hemoglobin A1c for screening for diabetes mellitus.  B. Self-monitoring of blood pressure to confirm the absence of unrecognized hypertension.  C. A lifetime and 10-year ASCVD risk can then be calculated based on which lipid management if indicated can be initiated.  At the conclusion of the clinic encounter, the patient appeared to have a reasonable understanding of our discussion and stated no questions.  At this point, she is more interested and focused in pursuing the aforementioned differential diagnosis in terms of microvascular disease rather than lifestyle modification.  On this basis, I am supportive of her preference to seek consultation with a cardiologist colleague with expertise in the evaluation and management of coronary microvascular disease.  Follow-up as below-  A. She will be scheduled for an echocardiography study in the near future to document baseline cardiac size and function.    B. She will be referred to a cardiology colleague with special interest and expertise in the evaluation and management of possible coronary microvascular disease.    Total time spent today for this visit is 30 minutes including precharting, face-to-face clinic visit, review of labs/imaging and medical documentation.    Dayna Grajeda MD, FACC, FAHUY, RS, [facility]  Cardiologist    (This medical documentation was transcribed using voice recognition technology and therefore will be susceptible to minor unintentional transcribing errors and/or omissions)

## 2024-12-13 NOTE — NURSING NOTE
Cardiac Testing:   -Complete an echocardiogram     Med Reconcile: Reviewed and verified all current medications with the patient. The updated medication list was printed and given to the patient./ No medication changes.         Return Appointment  -Referral to vascular medicine

## 2024-12-13 NOTE — NURSING NOTE
Chief Complaint   Patient presents with    New Patient     New general cardiology for elevated coronary artery calcium score, chest tightness       Vitals were taken, medications reviewed.    Ita Butler, EMT   1:12 PM

## 2024-12-13 NOTE — PATIENT INSTRUCTIONS
Take your medicines every day, as directed     Changes made today:  Complete an echocardiogram        Cardiology Care Coordinators:      Anastasiya NELSON RN     Cardiology Rooming staff:  Ita SIFUENTES    Phone  [phone]      Fax [phone]    To Contact us     During Business Hours:  [phone]     If you are needing refills please contact your pharmacy.     For urgent after hour care please call the [facility] at [phone] or the [facility] at [phone] and ask to speak to the cardiologist on call.            HOW TO CHECK YOUR BLOOD PRESSURE AT HOME:     Avoid eating, smoking, and exercising for at least 30 minutes before taking a reading.     Be sure you have taken your BP medication at least 2-3 hours before you check it.      Sit quietly for 10 minutes before a reading.      Sit in a chair with your feet flat on the floor. Rest your  arm on a table so that the arm cuff is at the same level as your heart.     Remain still during the reading.  Record your blood pressure and pulse in a log and bring to your next appointment.       Use [employer] allows you to communicate directly with your heart team through secure messaging.  [employer] can be accessed any time on your phone, computer, or tablet.  If you need assistance, we'd be happy to help!             Keep your Heart Appointments:     Referral to vascular

## 2024-12-17 ENCOUNTER — TELEPHONE (OUTPATIENT)
Dept: CARDIOLOGY | Facility: CLINIC | Age: 54
End: 2024-12-17
Payer: COMMERCIAL

## 2024-12-17 NOTE — TELEPHONE ENCOUNTER
Advised that we will watch out for the results of the echo scheduled on 12/27 and follow up accordingly. Patient verbalized understanding and did not have any additional questions at this time. Rachel Gil RN on 12/17/2024 at 3:50 PM  ______________________________________________________________________________    Echo results:  Right Ventricle - The right ventricle is normal size. Global right ventricular function is normal.  Tricuspid Valve - The tricuspid valve is normal. Trace tricuspid insufficiency is present. Pulmonary artery systolic pressure is normal.  ____________________________________________________________________________    Unable to reach patient; left detailed message advising echo results show normal RVSP pressure. Patient does not need to follow with PH clinic. Left my direct number for call back with any questions or concerns. Rachel Gil RN on 12/30/2024 at 8:30 AM

## 2024-12-17 NOTE — TELEPHONE ENCOUNTER
[facility] Call Center    Phone Message    May a detailed message be left on voicemail: yes     Reason for Call: Other: Patient will like to schedule appt with Dr. Pickett for pulmonary hypertension. Patient will like to know if right and left heart cath can also be ordered. Please review and call patient back to further discuss and coordinate.      Action Taken: Other: Cardiology    Travel Screening: Not Applicable     Thank you!  [facility]

## 2024-12-18 DIAGNOSIS — F17.200 TOBACCO USE DISORDER: Primary | ICD-10-CM

## 2024-12-27 ENCOUNTER — APPOINTMENT (OUTPATIENT)
Dept: GENERAL RADIOLOGY | Facility: CLINIC | Age: 54
End: 2024-12-27
Attending: NURSE PRACTITIONER
Payer: COMMERCIAL

## 2024-12-27 ENCOUNTER — HOSPITAL ENCOUNTER (EMERGENCY)
Facility: CLINIC | Age: 54
Discharge: HOME | End: 2024-12-27
Attending: STUDENT IN AN ORGANIZED HEALTH CARE EDUCATION/TRAINING PROGRAM | Admitting: STUDENT IN AN ORGANIZED HEALTH CARE EDUCATION/TRAINING PROGRAM
Payer: COMMERCIAL

## 2024-12-27 ENCOUNTER — ANCILLARY PROCEDURE (OUTPATIENT)
Dept: CARDIOLOGY | Facility: CLINIC | Age: 54
End: 2024-12-27
Attending: INTERNAL MEDICINE
Payer: COMMERCIAL

## 2024-12-27 VITALS
SYSTOLIC BLOOD PRESSURE: 144 MMHG | DIASTOLIC BLOOD PRESSURE: 65 MMHG | OXYGEN SATURATION: 99 % | TEMPERATURE: 97.7 F | HEART RATE: 93 BPM | RESPIRATION RATE: 18 BRPM

## 2024-12-27 DIAGNOSIS — R00.2 PALPITATIONS: ICD-10-CM

## 2024-12-27 DIAGNOSIS — R93.1 ELEVATED CORONARY ARTERY CALCIUM SCORE: Primary | ICD-10-CM

## 2024-12-27 DIAGNOSIS — R07.9 CHEST PAIN, UNSPECIFIED TYPE: ICD-10-CM

## 2024-12-27 DIAGNOSIS — R93.1 ELEVATED CORONARY ARTERY CALCIUM SCORE: ICD-10-CM

## 2024-12-27 LAB
ANION GAP SERPL CALCULATED.3IONS-SCNC: 14 MMOL/L (ref 7–15)
ATRIAL RATE - MUSE: 109 BPM
BASOPHILS # BLD AUTO: 0.1 10E3/UL (ref 0–0.2)
BASOPHILS NFR BLD AUTO: 1 %
BUN SERPL-MCNC: 11.8 MG/DL (ref 6–20)
CALCIUM SERPL-MCNC: 10 MG/DL (ref 8.8–10.4)
CHLORIDE SERPL-SCNC: 102 MMOL/L (ref 98–107)
CHOLEST SERPL-MCNC: 221 MG/DL
CREAT SERPL-MCNC: 0.75 MG/DL (ref 0.51–0.95)
DIASTOLIC BLOOD PRESSURE - MUSE: NORMAL MMHG
EGFRCR SERPLBLD CKD-EPI 2021: >90 ML/MIN/1.73M2
EOSINOPHIL # BLD AUTO: 0.3 10E3/UL (ref 0–0.7)
EOSINOPHIL NFR BLD AUTO: 2 %
ERYTHROCYTE [DISTWIDTH] IN BLOOD BY AUTOMATED COUNT: 12.2 % (ref 10–15)
GLUCOSE SERPL-MCNC: 104 MG/DL (ref 70–99)
HCO3 SERPL-SCNC: 23 MMOL/L (ref 22–29)
HCT VFR BLD AUTO: 41.2 % (ref 35–47)
HDLC SERPL-MCNC: 70 MG/DL
HGB BLD-MCNC: 15.2 G/DL (ref 11.7–15.7)
HOLD SPECIMEN: NORMAL
HOLD SPECIMEN: NORMAL
IMM GRANULOCYTES # BLD: 0 10E3/UL
IMM GRANULOCYTES NFR BLD: 0 %
INTERPRETATION ECG - MUSE: NORMAL
LDLC SERPL CALC-MCNC: 127 MG/DL
LVEF ECHO: NORMAL
LYMPHOCYTES # BLD AUTO: 2.5 10E3/UL (ref 0.8–5.3)
LYMPHOCYTES NFR BLD AUTO: 24 %
MCH RBC QN AUTO: 30.6 PG (ref 26.5–33)
MCHC RBC AUTO-ENTMCNC: 36.9 G/DL (ref 31.5–36.5)
MCV RBC AUTO: 83 FL (ref 78–100)
MONOCYTES # BLD AUTO: 0.4 10E3/UL (ref 0–1.3)
MONOCYTES NFR BLD AUTO: 4 %
NEUTROPHILS # BLD AUTO: 7 10E3/UL (ref 1.6–8.3)
NEUTROPHILS NFR BLD AUTO: 68 %
NONHDLC SERPL-MCNC: 151 MG/DL
NRBC # BLD AUTO: 0 10E3/UL
NRBC BLD AUTO-RTO: 0 /100
P AXIS - MUSE: 84 DEGREES
PLATELET # BLD AUTO: 284 10E3/UL (ref 150–450)
POTASSIUM SERPL-SCNC: 3.6 MMOL/L (ref 3.4–5.3)
PR INTERVAL - MUSE: 134 MS
QRS DURATION - MUSE: 76 MS
QT - MUSE: 334 MS
QTC - MUSE: 449 MS
R AXIS - MUSE: 85 DEGREES
RBC # BLD AUTO: 4.96 10E6/UL (ref 3.8–5.2)
SODIUM SERPL-SCNC: 139 MMOL/L (ref 135–145)
SYSTOLIC BLOOD PRESSURE - MUSE: NORMAL MMHG
T AXIS - MUSE: 69 DEGREES
TRIGL SERPL-MCNC: 122 MG/DL
TROPONIN T SERPL HS-MCNC: <6 NG/L
VENTRICULAR RATE- MUSE: 109 BPM
WBC # BLD AUTO: 10.3 10E3/UL (ref 4–11)

## 2024-12-27 PROCEDURE — 93010 ELECTROCARDIOGRAM REPORT: CPT | Performed by: STUDENT IN AN ORGANIZED HEALTH CARE EDUCATION/TRAINING PROGRAM

## 2024-12-27 PROCEDURE — 80061 LIPID PANEL: CPT | Performed by: NURSE PRACTITIONER

## 2024-12-27 PROCEDURE — 84484 ASSAY OF TROPONIN QUANT: CPT | Performed by: STUDENT IN AN ORGANIZED HEALTH CARE EDUCATION/TRAINING PROGRAM

## 2024-12-27 PROCEDURE — 93005 ELECTROCARDIOGRAM TRACING: CPT | Performed by: STUDENT IN AN ORGANIZED HEALTH CARE EDUCATION/TRAINING PROGRAM

## 2024-12-27 PROCEDURE — 85025 COMPLETE CBC W/AUTO DIFF WBC: CPT | Performed by: STUDENT IN AN ORGANIZED HEALTH CARE EDUCATION/TRAINING PROGRAM

## 2024-12-27 PROCEDURE — 99215 OFFICE O/P EST HI 40 MIN: CPT | Mod: 25 | Performed by: NURSE PRACTITIONER

## 2024-12-27 PROCEDURE — 82374 ASSAY BLOOD CARBON DIOXIDE: CPT | Performed by: STUDENT IN AN ORGANIZED HEALTH CARE EDUCATION/TRAINING PROGRAM

## 2024-12-27 PROCEDURE — 71046 X-RAY EXAM CHEST 2 VIEWS: CPT

## 2024-12-27 PROCEDURE — 99285 EMERGENCY DEPT VISIT HI MDM: CPT | Mod: 25 | Performed by: STUDENT IN AN ORGANIZED HEALTH CARE EDUCATION/TRAINING PROGRAM

## 2024-12-27 PROCEDURE — 71046 X-RAY EXAM CHEST 2 VIEWS: CPT | Mod: 26 | Performed by: RADIOLOGY

## 2024-12-27 PROCEDURE — 99285 EMERGENCY DEPT VISIT HI MDM: CPT | Performed by: STUDENT IN AN ORGANIZED HEALTH CARE EDUCATION/TRAINING PROGRAM

## 2024-12-27 PROCEDURE — 36415 COLL VENOUS BLD VENIPUNCTURE: CPT | Performed by: STUDENT IN AN ORGANIZED HEALTH CARE EDUCATION/TRAINING PROGRAM

## 2024-12-27 PROCEDURE — 80048 BASIC METABOLIC PNL TOTAL CA: CPT | Performed by: STUDENT IN AN ORGANIZED HEALTH CARE EDUCATION/TRAINING PROGRAM

## 2024-12-27 RX ORDER — ATORVASTATIN CALCIUM 20 MG/1
20 TABLET, FILM COATED ORAL EVERY EVENING
Status: DISCONTINUED | OUTPATIENT
Start: 2024-12-27 | End: 2024-12-27 | Stop reason: HOSPADM

## 2024-12-27 RX ORDER — ASPIRIN 81 MG/1
81 TABLET ORAL DAILY
Status: DISCONTINUED | OUTPATIENT
Start: 2024-12-27 | End: 2024-12-27 | Stop reason: HOSPADM

## 2024-12-27 RX ORDER — AMLODIPINE BESYLATE 5 MG/1
5 TABLET ORAL DAILY
Status: DISCONTINUED | OUTPATIENT
Start: 2024-12-27 | End: 2024-12-27 | Stop reason: HOSPADM

## 2024-12-27 RX ADMIN — Medication 5 ML: at 13:22

## 2024-12-27 ASSESSMENT — ACTIVITIES OF DAILY LIVING (ADL)
ADLS_ACUITY_SCORE: 41

## 2024-12-27 ASSESSMENT — COLUMBIA-SUICIDE SEVERITY RATING SCALE - C-SSRS
1. IN THE PAST MONTH, HAVE YOU WISHED YOU WERE DEAD OR WISHED YOU COULD GO TO SLEEP AND NOT WAKE UP?: NO
6. HAVE YOU EVER DONE ANYTHING, STARTED TO DO ANYTHING, OR PREPARED TO DO ANYTHING TO END YOUR LIFE?: NO
2. HAVE YOU ACTUALLY HAD ANY THOUGHTS OF KILLING YOURSELF IN THE PAST MONTH?: NO

## 2024-12-27 NOTE — ED PROVIDER NOTES
ED Provider Note  [facility]      History     Chief Complaint   Patient presents with    Chest Pain     HPI  Sidra Macedo is a 54 year old female with a history of CAD and longstanding atypical chest pain who presents to the emergency department following referral from cardiology for chest tightness.  Patient reports she has had chest tightness ongoing for the past month.  She reports she is not experiencing any chest pain, only chest tightness.  She also endorses shortness of breath upon exertion.    ECHOCARDIOGRAM (12/27/2024)  Left ventricular function is normal.The ejection fraction is 55-60%. There is mild hypokinesis of the mid anteroseptum.  Global right ventricular function is normal. No significant valvular abnormalities present. Estimated mean right atrial pressure is normal. No pericardial effusion is present.    Past Medical History  Past Medical History:   Diagnosis Date    Headache(784.0)     intermittent migraines, treats with tylenol    Molar pregnancy      Past Surgical History:   Procedure Laterality Date    [facility] NONSPECIFIC PROCEDURE      ACL repair right knee     albuterol (PROAIR HFA/PROVENTIL HFA/VENTOLIN HFA) 108 (90 Base) MCG/ACT inhaler  Cyanocobalamin (VITAMIN B 12 PO)  fluticasone (FLONASE) 50 MCG/ACT nasal spray  nicotine (NICODERM CQ) 14 MG/24HR 24 hr patch  nicotine (NICODERM CQ) 21 MG/24HR 24 hr patch  nicotine (NICODERM CQ) 7 MG/24HR 24 hr patch  VITAMIN D, CHOLECALCIFEROL, PO  VITAMIN E PO      No Known Allergies  Family History  Family History   Problem Relation Age of Onset    Respiratory Mother         asthma    Breast Cancer Mother 63        BRCA negative    Heart Disease Father 50        bypass    Alcohol/Drug Father     Coronary Artery Disease Early Onset Paternal Grandfather     Breast Cancer Paternal Grandmother     Ovarian Cancer Paternal Grandmother     Liver Cancer Paternal Grandmother     Heart Disease Maternal Grandmother         CHF     Respiratory Maternal Grandmother         as    Cancer Maternal Grandmother         lung    Alcohol/Drug Maternal Grandmother     Neurologic Disorder Brother         seizures    Asthma Brother      Social History   Social History     Tobacco Use    Smoking status: Every Day     Current packs/day: 0.00     Average packs/day: 1 pack/day for 30.0 years (30.0 ttl pk-yrs)     Types: Cigarettes     Start date: 1975     Last attempt to quit: 2005     Years since quittin.4    Smokeless tobacco: Never   Vaping Use    Vaping status: Never Used   Substance Use Topics    Alcohol use: No     Alcohol/week: 0.0 standard drinks of alcohol    Drug use: No      A medically appropriate review of systems was performed with pertinent positives and negatives noted in the HPI, and all other systems negative.    Physical Exam   BP: (!) 168/85  Pulse: 106  Temp: 97.7  F (36.5  C)  Resp: 18  SpO2: 100 %  Physical Exam  Constitutional:       General: She is not in acute distress.     Appearance: Normal appearance. She is not diaphoretic.   HENT:      Head: Atraumatic.      Mouth/Throat:      Mouth: Mucous membranes are moist.   Eyes:      General: No scleral icterus.     Conjunctiva/sclera: Conjunctivae normal.   Cardiovascular:      Rate and Rhythm: Normal rate.      Heart sounds: Normal heart sounds. No murmur heard.  Pulmonary:      Effort: No respiratory distress.      Breath sounds: Normal breath sounds. No stridor. No wheezing, rhonchi or rales.   Chest:      Chest wall: No tenderness.   Abdominal:      General: Abdomen is flat.   Musculoskeletal:      Cervical back: Neck supple.   Skin:     General: Skin is warm.      Findings: No rash.   Neurological:      Mental Status: She is alert.           ED Course, Procedures, & Data      Procedures            EKG Interpretation:      Interpreted by Hardeep Ledezma MD  Time reviewed:15:00:00   Symptoms at time of EKG: None   Rhythm: Sinus tachycardia  Rate: 109 bpm  Axis:  Normal  Ectopy: None  Conduction: Normal  ST Segments/ T Waves: No ST-T wave changes and No acute ischemic changes  Q Waves: None  Comparison to prior: Unchanged from 2024    Clinical Impression: no acute ischemia            Results for orders placed or performed during the hospital encounter of 24   EKG 12 lead     Status: None (Preliminary result)   Result Value Ref Range    Systolic Blood Pressure  mmHg    Diastolic Blood Pressure  mmHg    Ventricular Rate 109 BPM    Atrial Rate 109 BPM    NJ Interval 134 ms    QRS Duration 76 ms     ms    QTc 449 ms    P Axis 84 degrees    R AXIS 85 degrees    T Axis 69 degrees    Interpretation ECG       Sinus tachycardia  Biatrial enlargement  Anteroseptal infarct , age undetermined  Abnormal ECG     Results for orders placed or performed in visit on 24   Echocardiogram Complete     Status: None   Result Value Ref Range    LVEF  55-60%     Narrative    [id number]  [id number]  [id number]^MICHAEL^ZOE^ILDA     [facility]  Echocardiography Laboratory  [address] [place] [address]     Name: SARBJIT LIND  MRN: [id number]  : 1970  Study Date: 2024 01:15 PM  Age: 54 yrs  Gender: Female  Patient Location: [facility]  Reason For Study: Elevated coronary artery calcium score, Palpitations  Ordering Physician: ZOE RODRIGUEZ  Referring Physician: ZOE RODRIGUEZ  Performed By: Minna Nunes     BSA: 1.6 m2  Height: 60 in  Weight: 137 lb  ______________________________________________________________________________  Procedure  Echocardiogram with two-dimensional, color and spectral Doppler. Contrast  Optison. Optison (NDC #3080-7406-60) given intravenously. Patient was given 5  ml mixture of 3 ml Optison and 6 ml saline. 4 ml wasted.  ______________________________________________________________________________  Interpretation Summary     Left ventricular function is normal.The ejection fraction is 55-60%.  There is  mild hypokinesis of the mid anteroseptum.  Global right ventricular function is normal.  No significant valvular abnormalities present.  Estimated mean right atrial pressure is normal.  No pericardial effusion is present.  ______________________________________________________________________________  Left Ventricle  Left ventricular size is normal. Left ventricular wall thickness is normal.  Left ventricular function is normal.The ejection fraction is 55-60%. There is  mild hypokinesis of the mid anteroseptum.     Right Ventricle  The right ventricle is normal size. Global right ventricular function is  normal.     Atria  Both atria appear normal. The atrial septum is intact as assessed by color  Doppler .     Mitral Valve  The mitral valve is normal. Trace mitral insufficiency is present.     Aortic Valve  Aortic valve is normal in structure and function. On Doppler interrogation,  there is no significant stenosis or regurgitation.     Tricuspid Valve  The tricuspid valve is normal. Trace tricuspid insufficiency is present.  Pulmonary artery systolic pressure is normal.     Pulmonic Valve  The pulmonic valve is normal.     Vessels  The aorta root is normal. Estimated mean right atrial pressure is normal.     Pericardium  No pericardial effusion is present.     Miscellaneous  No significant valvular abnormalities present.     Compared to Previous Study  Previous study not available for comparison.  ______________________________________________________________________________  MMode/2D Measurements & Calculations  IVSd: 0.92 cm  LVIDd: 4.3 cm  LVIDs: 2.9 cm  LVPWd: 0.95 cm  FS: 32.4 %  LV mass(C)d: 129.1 grams  LV mass(C)dI: 81.3 grams/m2  Ao root diam: 2.9 cm  asc Aorta Diam: 3.3 cm  LVOT diam: 2.0 cm  LVOT area: 3.1 cm2  Ao root diam index Ht(cm/m): 1.9  Ao root diam index BSA (cm/m2): 1.8  Asc Ao diam index BSA (cm/m2): 2.1  Asc Ao diam index Ht(cm/m): 2.2  LA Volume (BP): 31.0 ml     LA Volume Index  (BP): 19.5 ml/m2  RWT: 0.44     Doppler Measurements & Calculations  MV E max ike: 74.0 cm/sec  MV A max ike: 102.7 cm/sec  MV E/A: 0.72  MV dec time: 0.25 sec  Ao V2 max: 140.5 cm/sec  Ao max P.0 mmHg  Ao V2 mean: 96.0 cm/sec  Ao mean P.5 mmHg  Ao V2 VTI: 25.4 cm  MATTHEW(I,D): 2.6 cm2  MATTHEW(V,D): 2.2 cm2  LV V1 max P.0 mmHg  LV V1 max: 100.2 cm/sec  LV V1 VTI: 21.4 cm  SV(LVOT): 65.7 ml  SI(LVOT): 41.4 ml/m2  AV Ike Ratio (DI): 0.71     MATTHEW Index (cm2/m2): 1.6  E/E' avg: 10.6  Lateral E/e': 8.9  Medial E/e': 12.3     ______________________________________________________________________________  Report approved by: Penny Campos MD on 2024 01:56 PM           Medications - No data to display  Labs Ordered and Resulted from Time of ED Arrival to Time of ED Departure - No data to display  No orders to display          Critical care was not performed.     Medical Decision Making  The patient's presentation was of high complexity (an acute health issue posing potential threat to life or bodily function).    The patient's evaluation involved:  an assessment requiring an independent historian (see separate area of note for details)  review of external note(s) from 3+ sources (see separate area of note for details)  review of 3+ test result(s) ordered prior to this encounter (see separate area of note for details)  ordering and/or review of 3+ test(s) in this encounter (see separate area of note for details)  discussion of management or test interpretation with another health professional (cardiology, internal medicine)    The patient's management necessitated high risk (a decision regarding hospitalization).    Assessment & Plan    Patient was sent into the emergency room to be evaluated by cardiology from cardiology clinic for possible abnormal echocardiogram  Discussed case with cardiology as EKG here was reassuring, cardiology reviewed echocardiogram, initially was going to discharge patient, but through  shared decision making decided admit patient for observation overnight but requested labs drawn  Labs as reviewed and interpreted by me are reassuring with negative high-sensitivity troponin  Patient signed out to internal medicine physician Dr. Henderson    I have reviewed the nursing notes. I have reviewed the findings, diagnosis, plan and need for follow up with the patient.    New Prescriptions    No medications on file       Final diagnoses:   None   I, Rashida Ji, am serving as a trained medical scribe to document services personally performed by Hardeep Ledezma MD, based on the provider's statements to me.     I, Hardeep Ledezma MD, was physically present and have reviewed and verified the accuracy of this note documented by Rashida Ji.     Hardeep Ledezma MD  [facility] EMERGENCY DEPARTMENT  12/27/2024     Hardeep Ledezma MD  12/27/24 2158

## 2024-12-27 NOTE — ED TRIAGE NOTES
Arrives ambulatory with chest tightness and referral from cardiology. Had a echo done today that was abnormal.     Triage Assessment (Adult)       Row Name 12/27/24 7816          Triage Assessment    Airway WDL WDL        Respiratory WDL    Respiratory WDL WDL        Skin Circulation/Temperature WDL    Skin Circulation/Temperature WDL WDL        Cardiac WDL    Cardiac WDL X;chest pain  tightness        Peripheral/Neurovascular WDL    Peripheral Neurovascular WDL WDL        Cognitive/Neuro/Behavioral WDL    Cognitive/Neuro/Behavioral WDL WDL

## 2024-12-27 NOTE — CONSULTS
Cardiology Inpatient Consultation  December 27, 2024    Reason for Consult:  A cardiology consult was requested by Dr. Ledezma from the ED service to provide clinical guidance regarding chest tightness.    Assessment and Recommendation:  Sidra Macedo is a 54 year old female with PMhx of tobacco abuse, chronic chest tightness, seen in ED for chest tightness    # Atypical Chest Pain  # HTN  Pt reports has had chest tightness since 2011, but becoming constant and getting worse over last month. Pain felt mid chest/upper neck and intermittently gets worse with stress and activity, will radiate to shoulder and get better on its own, but never full resolve. She had a elevated calcium score 11/24 with subsequent reassuring CTA 12/24, mild-mod stenosis of LAD (less than 50%). Also completed Echo today showing normal EF 55-60%, over read with attending, no WMA, no valvular abnormalities, no signs of pulmonary hypertension. CAD risk factors include: HTN (/85 in ED), active smoker, family hx of premature CAD (father 50's).     - Check troponin x 1  - Given elevated CAC score, start 81 mg ASA daily, Lipitor 20 mg daily (ordered)  - Check lipid panel, if LDL elevated, consider Lipitor 40 mg daily (ordered)  - Advised to quit smoking  - Start Norvasc 5 mg daily for BP control, may also help with anginal symptoms (ordered)  - Pt uncomfortable discharging from ED, can admit to med obs overnight to see if Norvasc helps with pain, pt otherwise does not need further ischemic eval at this time  - Referral ordered for general/preventive cardiology as OP    Patient seen and discussed with Dr. Blake, who agrees with above plan.      Thank you for consulting the cardiovascular services at the [facility]. Please do not hesitate to call us with any questions.     Marjorie ZARATE, CNP  [facility] Cardiology Consult Team    HPI:   Sidra Macedo is a 54 year old female with PMhx of tobacco abuse, chronic  "chest tightness, seen in ED for chest tightness    Per patient, has had a constant mid chest, neck chest tightness for over a month that intermittently gets worse with stress and activity, will radiate to shoulder and get better on its own, but never full resolve. She was seen in the ED  for this with normal work up.  Later in  she had CAC screening completed with moderately elevated calcium scores. A CTA was then completed 24 showing mild-mod stenosis of LAD, otherwise normal vessels. At cardiology clinic, pt reported ongoing chest tightness, so an echo was ordered today, which also came back as normal.     Pt reports she decided to come to ED (had told ED team cardiology recommended admission), due to chest tightness, fatigue, and SOB.  Patient reports her father had 4V CABG in his 50's that was also \"undetectable.\"  She is not currently on any cardiac medications, is an active smoker, about 1 ppd for 35 years.     Review of Systems:    Complete review of systems was performed and negative except per HPI.    PMH:  Past Medical History:   Diagnosis Date    Headache(784.0)     intermittent migraines, treats with tylenol    Molar pregnancy      Active Problems:  Patient Active Problem List    Diagnosis Date Noted    Elevated coronary artery calcium score 2024     Priority: Medium    Trophoblastic neoplasm 2024     Priority: Medium    Tobacco use disorder 2011     Priority: Medium    Sinusitis, chronic 2011     Priority: Medium     Epic      Pregnancy with history of trophoblastic disease 2006     Priority: Medium    Lumbago 2003     Priority: Medium     Pain Low Back       Social History:  Social History     Tobacco Use    Smoking status: Every Day     Current packs/day: 0.00     Average packs/day: 1 pack/day for 30.0 years (30.0 ttl pk-yrs)     Types: Cigarettes     Start date: 1975     Last attempt to quit: 2005     Years since quittin.4    Smokeless " tobacco: Never   Vaping Use    Vaping status: Never Used   Substance Use Topics    Alcohol use: No     Alcohol/week: 0.0 standard drinks of alcohol    Drug use: No     Family History:  Family History   Problem Relation Age of Onset    Respiratory Mother         asthma    Breast Cancer Mother 63        BRCA negative    Heart Disease Father 50        bypass    Alcohol/Drug Father     Coronary Artery Disease Early Onset Paternal Grandfather     Breast Cancer Paternal Grandmother     Ovarian Cancer Paternal Grandmother     Liver Cancer Paternal Grandmother     Heart Disease Maternal Grandmother         CHF    Respiratory Maternal Grandmother         as    Cancer Maternal Grandmother         lung    Alcohol/Drug Maternal Grandmother     Neurologic Disorder Brother         seizures    Asthma Brother        Medications:  No current facility-administered medications for this encounter.       No current facility-administered medications for this encounter.       Physical Exam:  Temp:  [97.7  F (36.5  C)] 97.7  F (36.5  C)  Pulse:  [106] 106  Resp:  [18] 18  BP: (168)/(85) 168/85  SpO2:  [100 %] 100 %  No intake or output data in the 24 hours ending 12/27/24 1600  GEN: pleasant, no acute distress  HEENT: no icterus  CV: RRR, normal s1/s2, no murmurs/rubs/s3/s4, no heave. No JVD  CHEST: clear to ausculation bilaterally, no rales or wheezing  ABD: soft, non-tender, normal active bowel sounds  EXTR: pulses intact. No clubbing, cyanosis or edema.   NEURO: alert oriented, speech fluent/appropriate, motor grossly nonfocal      Diagnostics:  All labs and imaging were reviewed, of note:    CMPNo lab results found in last 7 days.  CBCNo lab results found in last 7 days.  INRNo lab results found in last 7 days.  Arterial Blood GasNo lab results found in last 7 days.    Lab Results   Component Value Date    TROPI <0.015 09/13/2020         EKG 12/27/2024: ST,       Echo 12/27/2024:  Interpretation Summary     Left ventricular  function is normal.The ejection fraction is 55-60%. There is  mild hypokinesis of the mid anteroseptum.  Global right ventricular function is normal.  No significant valvular abnormalities present.  Estimated mean right atrial pressure is normal.  No pericardial effusion is present.    CTA 12/9/2024:  Interpretation Summary  Left main is normal  Mixed calcified/soft plaque in the proximal LAD with < 50% stenosis   Circumflex coronary artery and its branches are normal   Right coronary artery is a large dominant vessel and is normal   Aorta is normal in size where seen   No LA thrombus  No pericardial effusion or calcification      CAC 11/11/2024:  Total coronary calcium score 143 in LAD places the individual in the 75th percentile when compared to an age and gender matched control group and implies a high risk of cardiac events in the next ten years.     Comments: Aggressive risk factor control and lifestyle modification due to elevated coronary calcium score    Medical Decision Making   60 MINUTES SPENT BY ME on the date of service doing chart review, history, exam, documentation & further activities per the note.

## 2024-12-28 NOTE — CONSULTS
"[facility]  Consult Note - Hospitalist Service  Date of Admission:  12/27/2024  Consult Requested by: ED provider  Reason for Consult: Consideration for observation admission for chest tightness    Assessment & Plan   Sidra Macedo is a 54 year old female admitted on 12/27/2024. She has past medical history including CAD, tobacco use, trophoblastic neoplasm, headaches, chest pain.  She presented to ED with chief complaint of chest tightness.     # atypical chest pain  Patient with complaint of chest tightness ongoing greater than one month.  Tightness is present all the time but does worsen with exertion.  Patient has exercise intolerance now and has had to greatly reduce her daily activities.  She feels fatigued all the time which is very abnormal for her.  Family history of heart problem in her father. Outpatient workup including CT coronary angio with an elevated calcium score and LAD <50% stenosis.  She also had a transthoracic echocardiogram which revealed LVEF 55 to 60%, mild hypokinesis of mid anteroseptum.  No suspected pulmonary hypertension was noted.  No pericardial effusion.  In ED, EKG without acute MI, negative troponin x 2.  Non-- fasting lipids in ED with elevated LDL, total cholesterol.  EKG notes \"biatrial enlargement\", however echocardiogram does not note atrial enlargement.  EKG also notes possible possible history of anteroseptal infarct, which would match with the echo finding of mild hypokinesis of mid anteroseptum.  However, no evidence for acute MI.  Given that emergent conditions were ruled out, patient was discharged from ED in joint decision with patient, physicians.    Have ruled out acute MI, pneumonia, pneumothorax, pericarditis.  Low suspicion for PE given no hypoxia no tachycardia; negative D-dimer from previous visit for chest pain.  Patient also had a very basic rheumatologic workup at outside facility [facility] with negative GEOVANI, " "negative rheumatoid factor, ESR, CRP normal.  This would not rule out a rheumatologic problem but does make it less likely at this point.  Remaining on the differential include GERD/PUD/gastritis, musculoskeletal pain, pleuritic chest pain, esophageal spasm.  Anxiety as diagnosis of exclusion in this case.      # reported R axillary lymphadenopathy   # reported change in breast tissue  Patient reports right axillary lymphadenopathy.  I am unable to confirm with assessment because we are so overfull in the ED and do not have a private room to examine patients -exam is conducted in public hallway.  Concern to me is that patient reports she has had breast tissue changes on both sides and has had to change bra sizes/cup sizes because of this.  She has not had a mammogram for many years.    -Patient also needs follow-up for this.  If this does not resolve, particularly concerning in light of chest pain, reported breast changes, pain, and fatigue      -Patient to follow-up with primary care, Dr.Phillip Blake. Will also route note to his clinic.    -Recommend CT screening for lung cancer.  I noticed this was already ordered by her primary care and patient should complete this as soon as able.    -Recommend outpatient diagnostic mammogram    -Recommend outpatient PFTs for consideration of COPD given her smoking history    -Recommend starting daily aspirin for CAD, statin for hyperlipidemia, amlodipine or other antihypertensive for blood pressure management.         The patient's care was discussed with the Attending Physician, Dr. eHnderson, Patient, and Dr. Ledezma .    Clinically Significant Risk Factors Present on Admission                             # Overweight: Estimated body mass index is 25.9 kg/m  as calculated from the following:    Height as of 12/13/24: 1.549 m (5' 0.98\").    Weight as of 12/13/24: 62.1 kg (137 lb).              [facility]  Hospitalist Service  Securely message with [employer] (more " info)  Text page via [facility] Paging/Directory   ______________________________________________________________________    Chief Complaint   Chest tightness    History is obtained from the patient, electronic health record, and emergency department physician    History of Present Illness   Sidra Macedo is a 54 year old female who has past medical history including CAD, tobacco use, trophoblastic neoplasm, headaches, chest pain.  She presented to ED with chief complaint of chest tightness.     This is ongoing over 1 month.  Also has several constitutional symptoms including severe fatigue, dyspnea on exertion, lymphadenopathy.    Patient is behind on her primary care screenings i.e. mammogram, colonoscopy, screening CT for lung cancer and a smoker, etc.    She states that the chest discomfort/tightness is always present.  It is central located over the sternum.  Does not seem to correlate with anything she eats.  She does not think that it is reflux-related.  She is frustrated that a cause has not been identified so far but thankful that her heart tests are looking okay so far.  We discussed importance of age-recommended cancer screenings and establishing a primary care provider that she trusts.  We discussed all of the testing that has been completed so far and possible differential.  She will return to ED if chest pain worsens or new concerns arise.  She will otherwise call for follow-up with primary care.    Past Medical History    Past Medical History:   Diagnosis Date    Headache(784.0)     intermittent migraines, treats with tylenol    Molar pregnancy        Past Surgical History   Past Surgical History:   Procedure Laterality Date    [facility] NONSPECIFIC PROCEDURE      ACL repair right knee       Medications   I have reviewed this patient's current medications          Physical Exam   Vital Signs: Temp: 97.7  F (36.5  C)   BP: (!) 168/85 Pulse: 106   Resp: 18 SpO2: 100 % O2 Device: None (Room air)      Note exam  limited because it had to be conducted in ED hallway.  Physical Exam  Constitutional:       General: She is not in acute distress.  Cardiovascular:      Rate and Rhythm: Regular rhythm. Tachycardia present.      Heart sounds: No murmur heard.  Pulmonary:      Effort: Pulmonary effort is normal. No respiratory distress.      Breath sounds: Normal breath sounds. No wheezing or rales.   Musculoskeletal:         General: Normal range of motion.      Right lower leg: No edema.      Left lower leg: No edema.   Skin:     General: Skin is dry.   Neurological:      Mental Status: She is alert. Mental status is at baseline.      Cranial Nerves: No cranial nerve deficit.       Medical Decision Making       75 MINUTES SPENT BY ME on the date of service doing chart review, history, exam, documentation & further activities per the note.      Data     I have personally reviewed the following data over the past 24 hrs:    10.3  \   15.2   / 284     139 102 11.8 /  104 (H)   3.6 23 0.75 \     Trop: <6 BNP: N/A

## 2024-12-28 NOTE — DISCHARGE INSTRUCTIONS
Patient to follow up with primary care, Dr. Blake. I will send him a message. Recommend getting diagnostic mammogram and also pulmonary function testing.  Of course if further chest pain occurs, feel free to return to ED at any time.

## 2024-12-30 ENCOUNTER — TELEPHONE (OUTPATIENT)
Dept: FAMILY MEDICINE | Facility: CLINIC | Age: 54
End: 2024-12-30
Payer: COMMERCIAL

## 2024-12-30 NOTE — TELEPHONE ENCOUNTER
Reason for Call:  Appointment Request    Patient requesting this type of appt:  Hospital/ED Follow-Up     Requested provider: Emre Blake    Reason patient unable to be scheduled: Not within requested timeframe    When does patient want to be seen/preferred time: 3-7 days    Comments: ER Follow Up within 1 Week of visit. ER Follow Up: 12/27, Chest Tightness & Swollen Lymphnode in [place]: [facility]. If no answer on call, can a [employer] message be sent?     Could we send this information to you in [employer] or would you prefer to receive a phone call?:   Patient would prefer a phone call   Okay to leave a detailed message?: Yes at Cell number on file:    Telephone Information:   Mobile [phone]       Call taken on 12/30/2024 at 9:17 AM by Melyssa Beyer

## 2024-12-30 NOTE — TELEPHONE ENCOUNTER
12/30/24  LM for pt to return call and ask for care team. Please ask if pt would be okay seeing another provider.  Jaylyn

## 2024-12-31 NOTE — TELEPHONE ENCOUNTER
"12-31-24  Called pt attempted to make a ED follow up appt, pt stated she since moved to [place] and switched her care now to \"[facility]\"  Tg   "

## 2025-01-09 NOTE — TELEPHONE ENCOUNTER
RECORDS RECEIVED FROM:    DATE RECEIVED:    NOTES STATUS DETAILS   OFFICE NOTE from referring provider  Internal    OFFICE NOTE from other cardiologists  Internal Dr. Grajeda 12-13-24   RECORDS from hospital/ED Internal 12-27-24   MEDICATION LIST Internal    GENERAL CARDIO RECORDS   (ALL APPOINTMENT TYPES)     LABS (CBC,BMP,CMP, TSH) Internal    EKG (STRIPS & REPORTS) Internal 12-27-24   MONITORS (STRIPS & REPORTS) N/A    ECHOS (IMAGES AND REPORTS) Internal 12-27-24   STRESS TESTS (IMAGES AND REPORTS) N/A    cMRI (IMAGES AND REPORTS) Internal 1-10-25 Scheduled   Cardiac cath (IMAGES AND REPORTS) N/A    CT/CTA (IMAGES AND REPORTS) Internal 12-9-24 Coronary artery

## 2025-01-10 ENCOUNTER — HOSPITAL ENCOUNTER (OUTPATIENT)
Dept: MRI IMAGING | Facility: CLINIC | Age: 55
Discharge: HOME | End: 2025-01-10
Attending: INTERNAL MEDICINE | Admitting: INTERNAL MEDICINE
Payer: COMMERCIAL

## 2025-01-10 VITALS — HEART RATE: 81 BPM | SYSTOLIC BLOOD PRESSURE: 126 MMHG | DIASTOLIC BLOOD PRESSURE: 62 MMHG

## 2025-01-10 DIAGNOSIS — R93.1 ELEVATED CORONARY ARTERY CALCIUM SCORE: ICD-10-CM

## 2025-01-10 DIAGNOSIS — I25.10 CAD (CORONARY ARTERY DISEASE): ICD-10-CM

## 2025-01-10 DIAGNOSIS — R07.9 CHEST PAIN: ICD-10-CM

## 2025-01-10 PROCEDURE — 255N000002 HC RX 255 OP 636: Performed by: INTERNAL MEDICINE

## 2025-01-10 PROCEDURE — 93005 ELECTROCARDIOGRAM TRACING: CPT

## 2025-01-10 PROCEDURE — 250N000011 HC RX IP 250 OP 636: Performed by: INTERNAL MEDICINE

## 2025-01-10 PROCEDURE — 93018 CV STRESS TEST I&R ONLY: CPT | Performed by: INTERNAL MEDICINE

## 2025-01-10 PROCEDURE — A9585 GADOBUTROL INJECTION: HCPCS | Performed by: INTERNAL MEDICINE

## 2025-01-10 PROCEDURE — 75563 CARD MRI W/STRESS IMG & DYE: CPT

## 2025-01-10 PROCEDURE — 93016 CV STRESS TEST SUPVJ ONLY: CPT | Performed by: INTERNAL MEDICINE

## 2025-01-10 PROCEDURE — 75563 CARD MRI W/STRESS IMG & DYE: CPT | Mod: 26 | Performed by: INTERNAL MEDICINE

## 2025-01-10 RX ORDER — LORAZEPAM 0.5 MG/1
0.5 TABLET ORAL EVERY 30 MIN PRN
Status: DISCONTINUED | OUTPATIENT
Start: 2025-01-10 | End: 2025-01-11 | Stop reason: HOSPADM

## 2025-01-10 RX ORDER — REGADENOSON 0.08 MG/ML
0.4 INJECTION, SOLUTION INTRAVENOUS ONCE
Status: COMPLETED | OUTPATIENT
Start: 2025-01-10 | End: 2025-01-10

## 2025-01-10 RX ORDER — GADOBUTROL 604.72 MG/ML
8 INJECTION INTRAVENOUS ONCE
Status: COMPLETED | OUTPATIENT
Start: 2025-01-10 | End: 2025-01-10

## 2025-01-10 RX ORDER — LIDOCAINE 40 MG/G
CREAM TOPICAL
Status: DISCONTINUED | OUTPATIENT
Start: 2025-01-10 | End: 2025-01-11 | Stop reason: HOSPADM

## 2025-01-10 RX ORDER — DIAZEPAM 5 MG/1
5 TABLET ORAL EVERY 30 MIN PRN
Status: DISCONTINUED | OUTPATIENT
Start: 2025-01-10 | End: 2025-01-11 | Stop reason: HOSPADM

## 2025-01-10 RX ORDER — DIAZEPAM 10 MG/2ML
5 INJECTION, SOLUTION INTRAMUSCULAR; INTRAVENOUS
Status: DISCONTINUED | OUTPATIENT
Start: 2025-01-10 | End: 2025-01-11 | Stop reason: HOSPADM

## 2025-01-10 RX ORDER — NITROGLYCERIN 0.4 MG/1
0.4 TABLET SUBLINGUAL EVERY 5 MIN PRN
Status: ACTIVE | OUTPATIENT
Start: 2025-01-10 | End: 2025-01-10

## 2025-01-10 RX ORDER — LORAZEPAM 2 MG/ML
0.5 INJECTION INTRAMUSCULAR
Status: DISCONTINUED | OUTPATIENT
Start: 2025-01-10 | End: 2025-01-11 | Stop reason: HOSPADM

## 2025-01-10 RX ORDER — ALBUTEROL SULFATE 90 UG/1
2 INHALANT RESPIRATORY (INHALATION) EVERY 5 MIN PRN
Status: DISCONTINUED | OUTPATIENT
Start: 2025-01-10 | End: 2025-01-11 | Stop reason: HOSPADM

## 2025-01-10 RX ORDER — SODIUM CHLORIDE 9 MG/ML
INJECTION, SOLUTION INTRAVENOUS CONTINUOUS PRN
OUTPATIENT
Start: 2025-01-10 | End: 2025-01-10

## 2025-01-10 RX ORDER — AMINOPHYLLINE 25 MG/ML
50-100 INJECTION, SOLUTION INTRAVENOUS
Status: COMPLETED | OUTPATIENT
Start: 2025-01-10 | End: 2025-01-10

## 2025-01-10 RX ADMIN — GADOBUTROL 8 ML: 604.72 INJECTION INTRAVENOUS at 13:38

## 2025-01-10 RX ADMIN — GADOBUTROL 8 ML: 604.72 INJECTION INTRAVENOUS at 13:37

## 2025-01-10 RX ADMIN — AMINOPHYLLINE 100 MG: 25 INJECTION, SOLUTION INTRAVENOUS at 13:03

## 2025-01-10 RX ADMIN — REGADENOSON 0.4 MG: 0.08 INJECTION, SOLUTION INTRAVENOUS at 12:59

## 2025-01-13 LAB
ATRIAL RATE - MUSE: 78 BPM
ATRIAL RATE - MUSE: 81 BPM
DIASTOLIC BLOOD PRESSURE - MUSE: NORMAL MMHG
DIASTOLIC BLOOD PRESSURE - MUSE: NORMAL MMHG
INTERPRETATION ECG - MUSE: NORMAL
INTERPRETATION ECG - MUSE: NORMAL
P AXIS - MUSE: 63 DEGREES
P AXIS - MUSE: 66 DEGREES
PR INTERVAL - MUSE: 140 MS
PR INTERVAL - MUSE: 146 MS
QRS DURATION - MUSE: 62 MS
QRS DURATION - MUSE: 66 MS
QT - MUSE: 362 MS
QT - MUSE: 368 MS
QTC - MUSE: 412 MS
QTC - MUSE: 427 MS
R AXIS - MUSE: 0 DEGREES
R AXIS - MUSE: 5 DEGREES
SYSTOLIC BLOOD PRESSURE - MUSE: NORMAL MMHG
SYSTOLIC BLOOD PRESSURE - MUSE: NORMAL MMHG
T AXIS - MUSE: 19 DEGREES
T AXIS - MUSE: 23 DEGREES
VENTRICULAR RATE- MUSE: 78 BPM
VENTRICULAR RATE- MUSE: 81 BPM

## 2025-01-21 ENCOUNTER — PRE VISIT (OUTPATIENT)
Dept: CARDIOLOGY | Facility: CLINIC | Age: 55
End: 2025-01-21
Payer: COMMERCIAL

## 2025-01-21 ENCOUNTER — OFFICE VISIT (OUTPATIENT)
Dept: CARDIOLOGY | Facility: CLINIC | Age: 55
End: 2025-01-21
Attending: INTERNAL MEDICINE
Payer: COMMERCIAL

## 2025-01-21 VITALS
SYSTOLIC BLOOD PRESSURE: 119 MMHG | DIASTOLIC BLOOD PRESSURE: 74 MMHG | WEIGHT: 143.4 LBS | BODY MASS INDEX: 27.11 KG/M2 | HEART RATE: 94 BPM | OXYGEN SATURATION: 98 %

## 2025-01-21 DIAGNOSIS — R07.9 CHEST PAIN, UNSPECIFIED TYPE: Primary | ICD-10-CM

## 2025-01-21 DIAGNOSIS — R93.1 ELEVATED CORONARY ARTERY CALCIUM SCORE: ICD-10-CM

## 2025-01-21 PROCEDURE — 99214 OFFICE O/P EST MOD 30 MIN: CPT | Performed by: INTERNAL MEDICINE

## 2025-01-21 PROCEDURE — 99213 OFFICE O/P EST LOW 20 MIN: CPT | Performed by: INTERNAL MEDICINE

## 2025-01-21 ASSESSMENT — PAIN SCALES - GENERAL: PAINLEVEL_OUTOF10: NO PAIN (0)

## 2025-01-21 NOTE — LETTER
1/21/2025      RE: Sidra Macedo  [address] [address]       Dear Colleague,    Thank you for the opportunity to participate in the care of your patient, Sidra Macedo, at the [facility]. Please see a copy of my visit note below.    I am delighted to see Sidra Macedo in consultation.The primary encounter diagnosis was Chest pain, unspecified type. A diagnosis of Elevated coronary artery calcium score was also pertinent to this visit.   As you know, the patient is a 55 year old  female. She   has a past medical history of Headache(784.0), Molar pregnancy, and Shortness of breath..    On this visit, the patient states that she has chest tightness especially when going up steps or with the treadmill on an incline. She also notes headache and fogginess when waking.  This has been happening since November. The patient denies palpitations, near-syncope, syncope, orthopnea, paroxysmal nocturnal dyspnea, and lower extermity edema.    The patient's cardiovascular risk factors include smoker and positive family history.    The following portions of the patient's history were reviewed and updated as appropriate: allergies, current medications, past family history, past medical history, past social history, past surgical history, and the problem list.    PMH: The patient's past medical history includes:    Past Medical History:   Diagnosis Date     Headache(784.0)     intermittent migraines, treats with tylenol     Molar pregnancy      Shortness of breath       Past Surgical History:   Procedure Laterality Date     [facility] NONSPECIFIC PROCEDURE      ACL repair right knee       The patient's medications as of the current encounter are:     No current outpatient medications on file.       Labs:     Hospital Outpatient Visit on 01/10/2025   Component Date Value Ref Range Status     Ventricular Rate 01/10/2025  78  BPM Final     Atrial Rate 01/10/2025 78  BPM Final     ME Interval 01/10/2025 146  ms Final     QRS Duration 01/10/2025 66  ms Final     QT 01/10/2025 362  ms Final     QTc 01/10/2025 412  ms Final     P Axis 01/10/2025 63  degrees Final     R AXIS 01/10/2025 0  degrees Final     T Axis 01/10/2025 23  degrees Final     Interpretation ECG 01/10/2025    Final                    Value:Sinus rhythm  Low voltage QRS  Cannot rule out Anteroseptal infarct (cited on or before 13-Sep-2020)  Abnormal ECG  When compared with ECG of 27-Dec-2024 14:59,  Questionable change in QRS axis  Confirmed by MD PEREIRA DAVID ([id number]) on 1/13/2025 4:10:51 PM       Ventricular Rate 01/10/2025 81  BPM Final     Atrial Rate 01/10/2025 81  BPM Final     ME Interval 01/10/2025 140  ms Final     QRS Duration 01/10/2025 62  ms Final     QT 01/10/2025 368  ms Final     QTc 01/10/2025 427  ms Final     P Axis 01/10/2025 66  degrees Final     R AXIS 01/10/2025 5  degrees Final     T Axis 01/10/2025 19  degrees Final     Interpretation ECG 01/10/2025    Final                    Value:Sinus rhythm  Low voltage QRS  Possible Anterolateral infarct (cited on or before 13-Sep-2020)  Abnormal ECG  When compared with ECG of 10-Mario-2025 12:06, (unconfirmed)  No significant change was found  Confirmed by MD PEREIRA DAVID ([id number]) on 1/13/2025 3:55:53 PM         Allergies:  No Known Allergies    Family History:   Family History   Problem Relation Age of Onset     Respiratory Mother         asthma     Breast Cancer Mother 63        BRCA negative     Coronary Artery Disease Father      Heart Disease Father 50        bypass     Alcohol/Drug Father      Heart Disease Maternal Grandmother         CHF     Respiratory Maternal Grandmother         as     Cancer Maternal Grandmother         lung     Alcohol/Drug Maternal Grandmother      Breast Cancer Paternal Grandmother      Ovarian Cancer Paternal Grandmother      Liver Cancer Paternal Grandmother      Coronary  Artery Disease Early Onset Paternal Grandfather      Diabetes Brother      Hypertension Brother      Neurologic Disorder Brother         seizures     Asthma Brother      Coronary Artery Disease Sister      No Known Problems Son      No Known Problems Son        Psychosocial history:  reports that she has been smoking cigarettes. She started smoking about 49 years ago. She has a 30 pack-year smoking history. She has never used smokeless tobacco. She reports that she does not drink alcohol and does not use drugs.    Review of systems: negative for, palpitations, paroxysmal nocturnal dyspnea, orthopnea, lower extremity edema, and syncope or near-syncope    In addition,   General: No change in weight, sleep or appetite.  Normal energy.  No fever or chills  Eyes: Negative for vision changes or eye problems  ENT: No problems with ears, nose or throat.  No difficulty swallowing.  Resp: No coughing, wheezing or shortness of breath  GI: No nausea, vomiting,  heartburn, abdominal pain, diarrhea, constipation or change in bowel habits  : No urinary frequency or dysuria, bladder or kidney problems  Musculoskeletal: Occ bilat knee pain  Neurologic: am headaches  Psychiatric: No problems with anxiety, depression or mental health  Heme/immune/allergy: No history of bleeding or clotting problems or anemia.    Endocrine: No history of thyroid disease, diabetes or other endocrine disorders  Skin: No rashes,worrisome lesions or skin problems  Vascular:  No claudication, lifestyle limiting or otherwise; no ischemic rest pain; no non-healing ulcers. No weakness, No loss of sensation        Physical examination  Vitals: /74 (BP Location: Right arm, Patient Position: Chair, Cuff Size: Adult Regular)   Pulse 94   Wt 65 kg (143 lb 6.4 oz)   LMP 08/28/2017 (Exact Date)   SpO2 98%   BMI 27.11 kg/m    BMI= Body mass index is 27.11 kg/m .    In general, the patient is a pleasant female in no apparent distress.    HEENT:  Normiocephalic and atraumatic.  PERRLA.  EOMI.  Sclerae white, not injected.    Neck: No adenopathy.  No thyromegaly. Carotids +2/2 bilaterally without bruits.  No jugular venous distension.   Heart:  The PMI is in the 5th ICS in the midclavicular line. There is no heave. Regular rate and rhythm. Normal S1, S2 splits physiologically. No murmur, rub, click, or gallop.    Lungs: Clear to asculation.  No ronchi, wheezes, rales.  No dullness to percussion. Occ ectopy  Abdomen: Soft, nontender, nondistended. No organomegaly. No AAA.  No bruits.   Extremities: No clubbing, cyanosis, or edema. The pulses were intact bilaterally.   Neurological: The neurological examination reveal a patient who was oriented to person, place, and time.  The remainder of the examination was nonfocal.  R axillary adenopathy    Cardiac tests include:    1/10/25 - NSR, low voltage,  ASMI age undetermined  12/27/24 - echo - normal EF, possible anteroseptal wall motion defect mild  12/9/24 - CTA angio - no obstructive lesions  11/19/24 - Ca score 143  1/10/25 - CMR - normal wall motion, no LGE, no perfusion defects    Assessment and Plan    Dyspnea, chest pain - unlikely cardiac  - consider cardiopulmonary exercise testing  - PFTs scheduled  - CBC normal  2. Morning headaches - consider sleep study    The patient is to return  prn. The patient understood the treatment plan as outlined above.  There were no barriers to learning.      Soy Valencia MD     Please do not hesitate to contact me if you have any questions/concerns.     Sincerely,     Soy Valencia MD

## 2025-01-21 NOTE — PROGRESS NOTES
I am delighted to see Sidra Macedo in consultation.The primary encounter diagnosis was Chest pain, unspecified type. A diagnosis of Elevated coronary artery calcium score was also pertinent to this visit.   As you know, the patient is a 55 year old  female. She   has a past medical history of Headache(784.0), Molar pregnancy, and Shortness of breath..    On this visit, the patient states that she has chest tightness especially when going up steps or with the treadmill on an incline. She also notes headache and fogginess when waking.  This has been happening since November. The patient denies palpitations, near-syncope, syncope, orthopnea, paroxysmal nocturnal dyspnea, and lower extermity edema.    The patient's cardiovascular risk factors include smoker and positive family history.    The following portions of the patient's history were reviewed and updated as appropriate: allergies, current medications, past family history, past medical history, past social history, past surgical history, and the problem list.    PMH: The patient's past medical history includes:    Past Medical History:   Diagnosis Date    Headache(784.0)     intermittent migraines, treats with tylenol    Molar pregnancy     Shortness of breath       Past Surgical History:   Procedure Laterality Date    [facility] NONSPECIFIC PROCEDURE      ACL repair right knee       The patient's medications as of the current encounter are:     No current outpatient medications on file.       Labs:     Hospital Outpatient Visit on 01/10/2025   Component Date Value Ref Range Status    Ventricular Rate 01/10/2025 78  BPM Final    Atrial Rate 01/10/2025 78  BPM Final    [place] Interval 01/10/2025 146  ms Final    QRS Duration 01/10/2025 66  ms Final    QT 01/10/2025 362  ms Final    QTc 01/10/2025 412  ms Final    P Axis 01/10/2025 63  degrees Final    R AXIS 01/10/2025 0  degrees Final    T Axis 01/10/2025 23  degrees Final    Interpretation ECG 01/10/2025     Final                    Value:Sinus rhythm  Low voltage QRS  Cannot rule out Anteroseptal infarct (cited on or before 13-Sep-2020)  Abnormal ECG  When compared with ECG of 27-Dec-2024 14:59,  Questionable change in QRS axis  Confirmed by MD PEREIRA DAVID ([id number]) on 1/13/2025 4:10:51 PM      Ventricular Rate 01/10/2025 81  BPM Final    Atrial Rate 01/10/2025 81  BPM Final    [place] Interval 01/10/2025 140  ms Final    QRS Duration 01/10/2025 62  ms Final    QT 01/10/2025 368  ms Final    QTc 01/10/2025 427  ms Final    P Axis 01/10/2025 66  degrees Final    R AXIS 01/10/2025 5  degrees Final    T Axis 01/10/2025 19  degrees Final    Interpretation ECG 01/10/2025    Final                    Value:Sinus rhythm  Low voltage QRS  Possible Anterolateral infarct (cited on or before 13-Sep-2020)  Abnormal ECG  When compared with ECG of 10-Mario-2025 12:06, (unconfirmed)  No significant change was found  Confirmed by MD PEREIRA DAVID ([id number]) on 1/13/2025 3:55:53 PM         Allergies:  No Known Allergies    Family History:   Family History   Problem Relation Age of Onset    Respiratory Mother         asthma    Breast Cancer Mother 63        BRCA negative    Coronary Artery Disease Father     Heart Disease Father 50        bypass    Alcohol/Drug Father     Heart Disease Maternal Grandmother         CHF    Respiratory Maternal Grandmother         as    Cancer Maternal Grandmother         lung    Alcohol/Drug Maternal Grandmother     Breast Cancer Paternal Grandmother     Ovarian Cancer Paternal Grandmother     Liver Cancer Paternal Grandmother     Coronary Artery Disease Early Onset Paternal Grandfather     Diabetes Brother     Hypertension Brother     Neurologic Disorder Brother         seizures    Asthma Brother     Coronary Artery Disease Sister     No Known Problems Son     No Known Problems Son        Psychosocial history:  reports that she has been smoking cigarettes. She started smoking about 49 years ago. She has a 30  pack-year smoking history. She has never used smokeless tobacco. She reports that she does not drink alcohol and does not use drugs.    Review of systems: negative for, palpitations, paroxysmal nocturnal dyspnea, orthopnea, lower extremity edema, and syncope or near-syncope    In addition,   General: No change in weight, sleep or appetite.  Normal energy.  No fever or chills  Eyes: Negative for vision changes or eye problems  ENT: No problems with ears, nose or throat.  No difficulty swallowing.  Resp: No coughing, wheezing or shortness of breath  GI: No nausea, vomiting,  heartburn, abdominal pain, diarrhea, constipation or change in bowel habits  : No urinary frequency or dysuria, bladder or kidney problems  Musculoskeletal: Occ bilat knee pain  Neurologic: am headaches  Psychiatric: No problems with anxiety, depression or mental health  Heme/immune/allergy: No history of bleeding or clotting problems or anemia.    Endocrine: No history of thyroid disease, diabetes or other endocrine disorders  Skin: No rashes,worrisome lesions or skin problems  Vascular:  No claudication, lifestyle limiting or otherwise; no ischemic rest pain; no non-healing ulcers. No weakness, No loss of sensation        Physical examination  Vitals: /74 (BP Location: Right arm, Patient Position: Chair, Cuff Size: Adult Regular)   Pulse 94   Wt 65 kg (143 lb 6.4 oz)   LMP 08/28/2017 (Exact Date)   SpO2 98%   BMI 27.11 kg/m    BMI= Body mass index is 27.11 kg/m .    In general, the patient is a pleasant female in no apparent distress.    HEENT: Normiocephalic and atraumatic.  PERRLA.  EOMI.  Sclerae white, not injected.    Neck: No adenopathy.  No thyromegaly. Carotids +2/2 bilaterally without bruits.  No jugular venous distension.   Heart:  The PMI is in the 5th ICS in the midclavicular line. There is no heave. Regular rate and rhythm. Normal S1, S2 splits physiologically. No murmur, rub, click, or gallop.    Lungs: Clear to  asculation.  No ronchi, wheezes, rales.  No dullness to percussion. Occ ectopy  Abdomen: Soft, nontender, nondistended. No organomegaly. No AAA.  No bruits.   Extremities: No clubbing, cyanosis, or edema. The pulses were intact bilaterally.   Neurological: The neurological examination reveal a patient who was oriented to person, place, and time.  The remainder of the examination was nonfocal.  R axillary adenopathy    Cardiac tests include:    1/10/25 - NSR, low voltage,  ASMI age undetermined  12/27/24 - echo - normal EF, possible anteroseptal wall motion defect mild  12/9/24 - CTA angio - no obstructive lesions  11/19/24 - Ca score 143  1/10/25 - CMR - normal wall motion, no LGE, no perfusion defects    Assessment and Plan    Dyspnea, chest pain - unlikely cardiac  - consider cardiopulmonary exercise testing  - PFTs scheduled  - CBC normal  2. Morning headaches - consider sleep study    The patient is to return  prn. The patient understood the treatment plan as outlined above.  There were no barriers to learning.      Soy Valencia MD

## 2025-01-21 NOTE — NURSING NOTE
Chief Complaint   Patient presents with    New Patient     1/21/2025 visit with Soy Valencia MD for NEW CARDIOLOGY - Records complete CJ.         Vitals were taken and medications reconciled.    Blood pressure 119/74, pulse 94, weight 65 kg (143 lb 6.4 oz), last menstrual period 08/28/2017, SpO2 98%.    Willi Og, EMT  7:09 AM

## 2025-04-12 ENCOUNTER — HEALTH MAINTENANCE LETTER (OUTPATIENT)
Age: 55
End: 2025-04-12

## 2025-06-09 ENCOUNTER — TELEPHONE (OUTPATIENT)
Dept: FAMILY MEDICINE | Facility: CLINIC | Age: 55
End: 2025-06-09
Payer: COMMERCIAL

## 2025-06-09 NOTE — TELEPHONE ENCOUNTER
Patient Quality Outreach    Patient is due for the following:   Colon Cancer Screening  Breast Cancer Screening - Mammogram  Physical Preventive Adult Physical      Topic Date Due    Pneumococcal Vaccine (1 of 2 - PCV) Never done    Hepatitis B Vaccine (3 of 3 - 19+ 3-dose series) 01/02/2014    Zoster (Shingles) Vaccine (1 of 2) Never done    COVID-19 Vaccine (2 - 2024-25 season) 09/01/2024       Action(s) Taken:   Schedule a Adult Preventative    Type of outreach:    Sent [employer] message.    Questions for provider review:    None         Kelsey Miguel MA  Chart routed to None.

## 2025-06-14 ENCOUNTER — HEALTH MAINTENANCE LETTER (OUTPATIENT)
Age: 55
End: 2025-06-14

## 2025-06-18 ENCOUNTER — TELEPHONE (OUTPATIENT)
Dept: FAMILY MEDICINE | Facility: CLINIC | Age: 55
End: 2025-06-18
Payer: COMMERCIAL

## 2025-06-18 NOTE — TELEPHONE ENCOUNTER
Patient Quality Outreach    Patient is due for the following:   Colon Cancer Screening  Cervical Cancer Screening - PAP Needed  Physical Preventive Adult Physical    Action(s) Taken:   Schedule a Adult Preventative    Type of outreach:    Sent [employer] message.    Questions for provider review:    None         Tra Ross CMA  Chart routed to None.

## (undated) RX ORDER — REGADENOSON 0.08 MG/ML
INJECTION, SOLUTION INTRAVENOUS
Status: DISPENSED
Start: 2025-01-10

## (undated) RX ORDER — AMINOPHYLLINE 25 MG/ML
INJECTION, SOLUTION INTRAVENOUS
Status: DISPENSED
Start: 2025-01-10